# Patient Record
Sex: FEMALE | Race: WHITE | NOT HISPANIC OR LATINO | Employment: OTHER | URBAN - METROPOLITAN AREA
[De-identification: names, ages, dates, MRNs, and addresses within clinical notes are randomized per-mention and may not be internally consistent; named-entity substitution may affect disease eponyms.]

---

## 2017-08-14 NOTE — PROGRESS NOTES
SUBJECTIVE:   CC: Janet Barr is an 57 year old woman who presents for preventive health visit.     She has 2 issues today    1. Sore throat off and on for few months, no swallow issues, no f,c,s.    2. Heartburn off and on for 2 onths or more, usually food related, nothing otherwise makes it better or worse.  No f,c,s or weight loss, no abdomen pain or n/v, bm fine.  Coffee can affect it.  Not using prilosec reg.  No prior h/o this, just abdomen discomfort from time to time.  Not exertional.    No other c/o on review of systems, working out reg .Wants to get pap again, d/w patient not indicated but she prefers to have it yearly.  Up to date mammogram and colon.    Healthy Habits:    Do you get at least three servings of calcium containing foods daily (dairy, green leafy vegetables, etc.)? yes    Amount of exercise or daily activities, outside of work: 4 day(s) per week    Problems taking medications regularly No    Medication side effects: No    Have you had an eye exam in the past two years? yes    Do you see a dentist twice per year? yes    Do you have sleep apnea, excessive snoring or daytime drowsiness?no              Today's PHQ-2 Score: PHQ-2 ( 1999 Pf[employer]) 7/15/2016 6/29/2015   Q1: Little interest or pleasure in doing things 0 0   Q2: Feeling down, depressed or hopeless 0 0   PHQ-2 Score 0 0         Abuse: Current or Past(Physical, Sexual or Emotional)- No  Do you feel safe in your environment - Yes  Social History   Substance Use Topics     Smoking status: Former Smoker     Packs/day: 0.25     Years: 15.00     Types: Cigarettes     Quit date: 1/1/1999     Smokeless tobacco: Never Used     Alcohol use No     The patient does not drink >3 drinks per day nor >7 drinks per week.                    History of Present Illness:   This patient is a 57 year old female who presents for a complete physical examination.            Past Medical History:      Past Medical History:   Diagnosis Date     Allergic  rhinitis      Anxiety      ASCUS on Pap smear 4/28/11    HPV-neg     Aseptic necrosis (H)      Helicobacter pylori (H. pylori)      History of colonoscopy 2012    nl     Low back pain     lbp for 20 years, seen 2014 by Dr. Walters     Personal history of tobacco use, presenting hazards to health dced approx 1998     Primary localized osteoarthrosis, pelvic region and thigh      Skin cancer      Unspecified functional disorder of stomach     peptic ulcer             Past Surgical History:      Past Surgical History:   Procedure Laterality Date     C NONSPECIFIC PROCEDURE  x 2    Right Hip     C NONSPECIFIC PROCEDURE  1969    adenoid removed     C NONSPECIFIC PROCEDURE      hemorrhoid op     C NONSPECIFIC PROCEDURE  2007    endometrial bx     C NONSPECIFIC PROCEDURE  2009    d & c, fibroid resection, endometrial ablation     C NONSPECIFIC PROCEDURE  2009    Right total hip arthroplasty.     COLONOSCOPY  12/10/2012    Procedure: COLONOSCOPY;  colonoscopy;  Surgeon: Alonzo Milton MD;  Location:  GI             Social History:     Social History     Social History     Marital status:      Spouse name: N/A     Number of children: 2     Years of education: N/A     Occupational History     cleaning service Self     Social History Main Topics     Smoking status: Former Smoker     Packs/day: 0.25     Years: 15.00     Types: Cigarettes     Quit date: 1/1/1999     Smokeless tobacco: Never Used     Alcohol use No     Drug use: No     Sexual activity: Yes     Other Topics Concern     Not on file     Social History Narrative             Family History:   Reviewed          Immunizations:     Immunization History   Administered Date(s) Administered     TDAP Vaccine (Adacel) 05/21/2013            Allergies:     Allergies   Allergen Reactions     Celecoxib      gi     Codeine      headache              Medications:     Current Outpatient Prescriptions on File Prior to Visit:  omeprazole (PRILOSEC) 20 MG capsule TAKE  "30'-60' BEFORE 1ST MEAL DAILY IF NEEDED   cetirizine (ZYRTEC) 10 MG tablet Take 1 tablet by mouth daily as needed for allergies.   Multiple Vitamins-Minerals (MULTIVITAL PO) Take  by mouth.     No current facility-administered medications on file prior to visit.           Review of Systems:   The 10 point Review of Systems is negative other than noted in the HPI           Physical Exam:   Vitals were reviewed  Blood pressure (!) 89/60, pulse 54, temperature 98.1  F (36.7  C), temperature source Oral, height 5' 5\" (1.651 m), weight 147 lb (66.7 kg), SpO2 100 %, not currently breastfeeding.    Exam:  Constitutional: healthy appearing, alert and in no distress  Heent: Normocephalic. Head without obvious masses or lesions. PERRLDC, EOMI. Mouth exam within normal limits: tongue, mucous membranes, posterior pharynx all normal, no lesions or abnormalities seen.  Tm's and canals within normal limits bilaterally. Neck supple, no nuchal rigidity or masses. No supraclavicular, or cervical adenopathy. Thyroid symmetric, no masses.  Cardiovascular: Regular rate and rhythm, no murmer, rub or gallops.  JVP not elevated, no edema.  Carotids within normal limits bilaterally, no bruits.  Respiratory: Normal respiratory effort.  Lungs clear, normal flow, no wheezing or crackles.  Breasts: Normal bilaterally.  No masses or lesions.  Nipples within normal limites.  No axillary lesions or nodes.  Gastrointestinal: Normal active bowel sounds.   Soft, not tender, no masses, guarding or rebound.  No hepatosplenomegaly.   Pelvic: External genitalia within normal limits.  No masses or lesions.  Speculum exam unremarkable.  Cervix intact, no lesions. No significant discharge seen. Bimanual exam within normal limits.  No abnormal masses felt and no tenderness.  Recto-vaginal exam within normal limits.  Exam chaperoned by nurse.  Musculoskeletal: extremities normal, no gross deformities noted.  Skin: no suspicious lesions or rashes   Neurologic: " Mental status within normal limits.  Speech fluent.  No gross motor abnormalities and gait intact.  Psychiatric: mentation appears normal and affect normal.         Data:   Labs sent        Assessment:   1. Normal cpx  2. Gerd, doubt cv cause, malig cause  3. Throat discomfort, neg exam, suspect gerd  4. Abnormal pap, follow up have been normal  5. hcm         Plan:   Up to date immunizations, mammogram  Try daily ppi and if symptoms not gone in next 10 days call.  Use it for 6 weeks and if c/o return call and consider ent and egd  Exercise ,diet  Letter with labs      Alexy Rojo MD

## 2017-08-14 NOTE — PATIENT INSTRUCTIONS
Use the new heartburn medication called pantoprazole and if the heartburn and sore throat are not gone in the next 2 weeks let me know.  Take the medicine for 6 weeks and then stop it and if your symptoms return let me know.    Call if other issues    Alexy Rojo M.D.            Preventive Health Recommendations  Female Ages 50 - 64    Yearly exam: See your health care provider every year in order to  o Review health changes.   o Discuss preventive care.    o Review your medicines if your doctor has prescribed any.      Get a Pap test every three years (unless you have an abnormal result and your provider advises testing more often).    If you get Pap tests with HPV test, you only need to test every 5 years, unless you have an abnormal result.     You do not need a Pap test if your uterus was removed (hysterectomy) and you have not had cancer.    You should be tested each year for STDs (sexually transmitted diseases) if you're at risk.     Have a mammogram every 1 to 2 years.    Have a colonoscopy at age 50, or have a yearly FIT test (stool test). These exams screen for colon cancer.      Have a cholesterol test every 5 years, or more often if advised.    Have a diabetes test (fasting glucose) every three years. If you are at risk for diabetes, you should have this test more often.     If you are at risk for osteoporosis (brittle bone disease), think about having a bone density scan (DEXA).    Shots: Get a flu shot each year. Get a tetanus shot every 10 years.    Nutrition:     Eat at least 5 servings of fruits and vegetables each day.    Eat whole-grain bread, whole-wheat pasta and brown rice instead of white grains and rice.    Talk to your provider about Calcium and Vitamin D.     Lifestyle    Exercise at least 150 minutes a week (30 minutes a day, 5 days a week). This will help you control your weight and prevent disease.    Limit alcohol to one drink per day.    No smoking.     Wear sunscreen to prevent skin  cancer.     See your dentist every six months for an exam and cleaning.    See your eye doctor every 1 to 2 years.

## 2017-08-15 ENCOUNTER — OFFICE VISIT (OUTPATIENT)
Dept: FAMILY MEDICINE | Facility: CLINIC | Age: 57
End: 2017-08-15
Payer: COMMERCIAL

## 2017-08-15 VITALS
DIASTOLIC BLOOD PRESSURE: 60 MMHG | HEART RATE: 54 BPM | TEMPERATURE: 98.1 F | SYSTOLIC BLOOD PRESSURE: 89 MMHG | HEIGHT: 65 IN | WEIGHT: 147 LBS | BODY MASS INDEX: 24.49 KG/M2 | OXYGEN SATURATION: 100 %

## 2017-08-15 DIAGNOSIS — Z11.51 SCREENING FOR HUMAN PAPILLOMAVIRUS: ICD-10-CM

## 2017-08-15 DIAGNOSIS — R12 HEARTBURN: ICD-10-CM

## 2017-08-15 DIAGNOSIS — M54.5 LOW BACK PAIN, UNSPECIFIED BACK PAIN LATERALITY, UNSPECIFIED CHRONICITY, WITH SCIATICA PRESENCE UNSPECIFIED: ICD-10-CM

## 2017-08-15 DIAGNOSIS — Z00.00 ROUTINE GENERAL MEDICAL EXAMINATION AT A HEALTH CARE FACILITY: Primary | ICD-10-CM

## 2017-08-15 DIAGNOSIS — R07.0 THROAT PAIN: ICD-10-CM

## 2017-08-15 DIAGNOSIS — R87.610 PAPANICOLAOU SMEAR OF CERVIX WITH ATYPICAL SQUAMOUS CELLS OF UNDETERMINED SIGNIFICANCE (ASC-US): ICD-10-CM

## 2017-08-15 LAB
ALBUMIN SERPL-MCNC: 3.9 G/DL (ref 3.4–5)
ALP SERPL-CCNC: 74 U/L (ref 40–150)
ALT SERPL W P-5'-P-CCNC: 31 U/L (ref 0–50)
ANION GAP SERPL CALCULATED.3IONS-SCNC: 5 MMOL/L (ref 3–14)
AST SERPL W P-5'-P-CCNC: 23 U/L (ref 0–45)
BILIRUB SERPL-MCNC: 0.6 MG/DL (ref 0.2–1.3)
BUN SERPL-MCNC: 14 MG/DL (ref 7–30)
CALCIUM SERPL-MCNC: 9.4 MG/DL (ref 8.5–10.1)
CHLORIDE SERPL-SCNC: 109 MMOL/L (ref 94–109)
CHOLEST SERPL-MCNC: 223 MG/DL
CO2 SERPL-SCNC: 28 MMOL/L (ref 20–32)
CREAT SERPL-MCNC: 0.79 MG/DL (ref 0.52–1.04)
ERYTHROCYTE [DISTWIDTH] IN BLOOD BY AUTOMATED COUNT: 13.4 % (ref 10–15)
GFR SERPL CREATININE-BSD FRML MDRD: 75 ML/MIN/1.7M2
GLUCOSE SERPL-MCNC: 84 MG/DL (ref 70–99)
HCT VFR BLD AUTO: 43.3 % (ref 35–47)
HDLC SERPL-MCNC: 95 MG/DL
HGB BLD-MCNC: 14.1 G/DL (ref 11.7–15.7)
LDLC SERPL CALC-MCNC: 111 MG/DL
MCH RBC QN AUTO: 29.7 PG (ref 26.5–33)
MCHC RBC AUTO-ENTMCNC: 32.6 G/DL (ref 31.5–36.5)
MCV RBC AUTO: 91 FL (ref 78–100)
NONHDLC SERPL-MCNC: 128 MG/DL
PLATELET # BLD AUTO: 223 10E9/L (ref 150–450)
POTASSIUM SERPL-SCNC: 4.3 MMOL/L (ref 3.4–5.3)
PROT SERPL-MCNC: 7 G/DL (ref 6.8–8.8)
RBC # BLD AUTO: 4.75 10E12/L (ref 3.8–5.2)
SODIUM SERPL-SCNC: 142 MMOL/L (ref 133–144)
TRIGL SERPL-MCNC: 84 MG/DL
WBC # BLD AUTO: 4.8 10E9/L (ref 4–11)

## 2017-08-15 PROCEDURE — 85027 COMPLETE CBC AUTOMATED: CPT | Performed by: INTERNAL MEDICINE

## 2017-08-15 PROCEDURE — 80061 LIPID PANEL: CPT | Performed by: INTERNAL MEDICINE

## 2017-08-15 PROCEDURE — 87624 HPV HI-RISK TYP POOLED RSLT: CPT | Performed by: INTERNAL MEDICINE

## 2017-08-15 PROCEDURE — 80053 COMPREHEN METABOLIC PANEL: CPT | Performed by: INTERNAL MEDICINE

## 2017-08-15 PROCEDURE — G0145 SCR C/V CYTO,THINLAYER,RESCR: HCPCS | Performed by: INTERNAL MEDICINE

## 2017-08-15 PROCEDURE — 36415 COLL VENOUS BLD VENIPUNCTURE: CPT | Performed by: INTERNAL MEDICINE

## 2017-08-15 PROCEDURE — 99396 PREV VISIT EST AGE 40-64: CPT | Performed by: INTERNAL MEDICINE

## 2017-08-15 RX ORDER — PANTOPRAZOLE SODIUM 40 MG/1
40 TABLET, DELAYED RELEASE ORAL DAILY
Qty: 60 TABLET | Refills: 1 | Status: SHIPPED | OUTPATIENT
Start: 2017-08-15 | End: 2018-09-20

## 2017-08-15 NOTE — MR AVS SNAPSHOT
After Visit Summary   8/15/2017    Janet Barr    MRN: [id number]           Patient Information     Date Of Birth          1960        Visit Information        Provider Department      8/15/2017 9:30 AM Alexy Rojo MD [facility]        Today's Diagnoses     Routine general medical examination at a health care facility    -  1    Papanicolaou smear of cervix with atypical squamous cells of undetermined significance (ASC-US)        Low back pain, unspecified back pain laterality, unspecified chronicity, with sciatica presence unspecified        Heartburn        Throat pain          Care Instructions    Use the new heartburn medication called pantoprazole and if the heartburn and sore throat are not gone in the next 2 weeks let me know.  Take the medicine for 6 weeks and then stop it and if your symptoms return let me know.    Call if other issues    Alexy Rojo M.D.            Preventive Health Recommendations  Female Ages 50 - 64    Yearly exam: See your health care provider every year in order to  o Review health changes.   o Discuss preventive care.    o Review your medicines if your doctor has prescribed any.      Get a Pap test every three years (unless you have an abnormal result and your provider advises testing more often).    If you get Pap tests with HPV test, you only need to test every 5 years, unless you have an abnormal result.     You do not need a Pap test if your uterus was removed (hysterectomy) and you have not had cancer.    You should be tested each year for STDs (sexually transmitted diseases) if you're at risk.     Have a mammogram every 1 to 2 years.    Have a colonoscopy at age 50, or have a yearly FIT test (stool test). These exams screen for colon cancer.      Have a cholesterol test every 5 years, or more often if advised.    Have a diabetes test (fasting glucose) every three years. If you are at risk for diabetes, you should have this test  more often.     If you are at risk for osteoporosis (brittle bone disease), think about having a bone density scan (DEXA).    Shots: Get a flu shot each year. Get a tetanus shot every 10 years.    Nutrition:     Eat at least 5 servings of fruits and vegetables each day.    Eat whole-grain bread, whole-wheat pasta and brown rice instead of white grains and rice.    Talk to your provider about Calcium and Vitamin D.     Lifestyle    Exercise at least 150 minutes a week (30 minutes a day, 5 days a week). This will help you control your weight and prevent disease.    Limit alcohol to one drink per day.    No smoking.     Wear sunscreen to prevent skin cancer.     See your dentist every six months for an exam and cleaning.    See your eye doctor every 1 to 2 years.            Follow-ups after your visit        Who to contact     If you have questions or need follow up information about today's clinic visit or your schedule please contact [facility] directly at [phone].  Normal or non-critical lab and imaging results will be communicated to you by [employer], letter or phone within 4 business days after the clinic has received the results. If you do not hear from us within 7 days, please contact the clinic through [employer] or phone. If you have a critical or abnormal lab result, we will notify you by phone as soon as possible.  Submit refill requests through [employer] or call your pharmacy and they will forward the refill request to us. Please allow 3 business days for your refill to be completed.          Additional Information About Your Visit        [employer] Information     [employer] gives you secure access to your electronic health record. If you see a primary care provider, you can also send messages to your care team and make appointments. If you have questions, please call your primary care clinic.  If you do not have a primary care provider, please call [phone] and they will assist you.        Care  "EveryWhere ID     This is your Care EveryWhere ID. This could be used by other organizations to access your [place] medical records  [id number]        Your Vitals Were     Pulse Temperature Height Pulse Oximetry Breastfeeding? BMI (Body Mass Index)    54 98.1  F (36.7  C) (Oral) 5' 5\" (1.651 m) 100% No 24.46 kg/m2       Blood Pressure from Last 3 Encounters:   08/15/17 (!) 89/60   07/15/16 (!) 85/63   06/29/15 92/59    Weight from Last 3 Encounters:   08/15/17 147 lb (66.7 kg)   07/15/16 142 lb (64.4 kg)   06/29/15 145 lb 9.6 oz (66 kg)              We Performed the Following     CBC with platelets     Comprehensive metabolic panel     Lipid panel reflex to direct LDL          Today's Medication Changes          These changes are accurate as of: 8/15/17  9:54 AM.  If you have any questions, ask your nurse or doctor.               Start taking these medicines.        Dose/Directions    pantoprazole 40 MG EC tablet   Commonly known as:  PROTONIX   Used for:  Heartburn, Throat pain   Started by:  Alexy Rojo MD        Dose:  40 mg   Take 1 tablet (40 mg) by mouth daily Take 30-60 minutes before a meal.   Quantity:  60 tablet   Refills:  1            Where to get your medicines      These medications were sent to [id number] [place] [address]  [address] [place] [address]     Phone:  [phone]     pantoprazole 40 MG EC tablet                Primary Care Provider Office Phone # Fax #    Alexy Rojo [phone] [address] [facility] [address]  [facility] [address]        Equal Access to Services     GAYLE GOODSON AH: Hadzhen Zhang, wakajalda luqadaha, qaybta kaalmada ademary, abel jimenez. So [facility] [phone].    ATENCIÓN: Si habla español, tiene a lemos disposición servicios gratuitos de asistencia lingüística. Llame al [phone].    We comply with applicable federal civil rights " laws and [place] laws. We do not discriminate on the basis of race, color, national origin, age, disability sex, sexual orientation or gender identity.            Thank you!     Thank you for choosing [facility]  for your care. Our goal is always to provide you with excellent care. Hearing back from our patients is one way we can continue to improve our services. Please take a few minutes to complete the written survey that you may receive in the mail after your visit with us. Thank you!             Your Updated Medication List - Protect others around you: Learn how to safely use, store and throw away your medicines at www.disposemymeds.org.          This list is accurate as of: 8/15/17  9:54 AM.  Always use your most recent med list.                   Brand Name Dispense Instructions for use Diagnosis    cetirizine 10 MG tablet    zyrTEC     Take 1 tablet by mouth daily as needed for allergies.    Chronic rhinitis       MULTIVITAL PO      Take  by mouth.        omeprazole 20 MG CR capsule    priLOSEC    90 capsule    TAKE 30'-60' BEFORE 1ST MEAL DAILY IF NEEDED    Dyspepsia       pantoprazole 40 MG EC tablet    PROTONIX    60 tablet    Take 1 tablet (40 mg) by mouth daily Take 30-60 minutes before a meal.    Heartburn, Throat pain

## 2017-08-15 NOTE — NURSING NOTE
"Chief Complaint   Patient presents with     Physical       Initial BP (!) 89/60  Pulse 54  Temp 98.1  F (36.7  C) (Oral)  Ht 5' 5\" (1.651 m)  Wt 147 lb (66.7 kg)  SpO2 100%  Breastfeeding? No  BMI 24.46 kg/m2 Estimated body mass index is 24.46 kg/(m^2) as calculated from the following:    Height as of this encounter: 5' 5\" (1.651 m).    Weight as of this encounter: 147 lb (66.7 kg).  Medication Reconciliation: complete   Jessica MAYERS CMA      "

## 2017-08-16 NOTE — PROGRESS NOTES
It was a pleasure seeing you for your physical examination.  I wanted to get back to you with your test results.  I have enclosed a copy for your review.      I am happy to report that your cbc or complete blood count is normal with no signs of anemia, leukemia or platelet abnormalities. Your chemistry panel shows no signs of diabetes.  Your blood salts, kidney tests, liver tests, and proteins are all fine.    Your total cholesterol is 223 with the normal range being below 200.  Your HDL or good cholesterol is 95 with the normal range being above 50.  Your LDL or bad cholesterol is 111 with the normal range being below 130.  These numbers are very similar to last year and, as you know, given the large amount of good cholesterol overall they are very good.    I am happy to bring you this overall excellent report.  I believe your health is excellent.  If you have any questions please call me.    Alexy Rojo M.D.

## 2017-08-17 LAB
COPATH REPORT: NORMAL
PAP: NORMAL

## 2017-08-30 LAB
FINAL DIAGNOSIS: NORMAL
HPV HR 12 DNA CVX QL NAA+PROBE: NEGATIVE
HPV16 DNA SPEC QL NAA+PROBE: NEGATIVE
HPV18 DNA SPEC QL NAA+PROBE: NEGATIVE
SPECIMEN DESCRIPTION: NORMAL

## 2017-09-10 DIAGNOSIS — R10.13 DYSPEPSIA: ICD-10-CM

## 2017-09-11 NOTE — TELEPHONE ENCOUNTER
Pending Prescriptions:                       Disp   Refills    omeprazole (PRILOSEC) 20 MG CR capsule [P*90 cap*3            Sig: TAKE 1 CAPSULE BY MOUTH 30-60 MINUTES BEFORE 1ST           MEAL DAILY IF NEEDED          Last Written Prescription Date: 7/26/16  Last Fill Quantity: 90,  # refills: 3   Last Office Visit with FMKENNEDI, P or [facility] prescribing provider: 8/15/17

## 2017-09-11 NOTE — TELEPHONE ENCOUNTER
Prescription approved per [facility] Refill Protocol.  Rosalia Mulligan RN

## 2017-12-20 ENCOUNTER — HOSPITAL ENCOUNTER (OUTPATIENT)
Dept: MAMMOGRAPHY | Facility: CLINIC | Age: 57
Discharge: HOME | End: 2017-12-20
Attending: INTERNAL MEDICINE | Admitting: INTERNAL MEDICINE
Payer: COMMERCIAL

## 2017-12-20 DIAGNOSIS — Z12.31 ENCOUNTER FOR SCREENING MAMMOGRAM FOR HIGH-RISK PATIENT: ICD-10-CM

## 2017-12-20 PROCEDURE — G0202 SCR MAMMO BI INCL CAD: HCPCS

## 2018-05-02 DIAGNOSIS — F41.9 ANXIETY: ICD-10-CM

## 2018-05-02 RX ORDER — LORAZEPAM 0.5 MG/1
0.25-0.5 TABLET ORAL EVERY 6 HOURS PRN
Qty: 15 TABLET | Refills: 0 | Status: SHIPPED | OUTPATIENT
Start: 2018-05-02 | End: 2020-10-27

## 2018-05-02 NOTE — TELEPHONE ENCOUNTER
Lorazepam   Last Written Prescription Date:  6/23/14  Last Fill Quantity: 15,   # refills: 0  Last Office Visit: 8/15/17- physical exam   Future Office visit: none    Drug not active on patient's medication list  Med is controlled substance     Lorazepam was discontinued 7/15/16    Left a message asking patient to call back   Need to verify reason for request - is anxiety increased? Would advise OV if having increased anxiety     Tiffany MAYERS RN

## 2018-05-02 NOTE — TELEPHONE ENCOUNTER
Fax from [facility] #[phone]    Patient is requesting a Rx for Lorazepam 0.5mg    LOV 8-15-17 [facility] triage    Nicole Hidalgo, RT (R)

## 2018-05-02 NOTE — TELEPHONE ENCOUNTER
Patient called back   Only uses Lorazepam for anxiety related to flying   Very rarely uses this but prescription is  now   [place] and is worried about the long flight   Leaving 18     Rx is pended. Plans to schedule her annual physical for 2018    Please advise     Tiffany MAYERS RN

## 2018-08-29 DIAGNOSIS — R10.13 DYSPEPSIA: ICD-10-CM

## 2018-08-29 NOTE — TELEPHONE ENCOUNTER
"omeprazole (PRILOSEC) 20 MG CR capsule 90 capsule 3 9/11/2017     Last Written Prescription Date:  9/11/17  Last Fill Quantity: 90,  # refills: 3   Last office visit: 8/15/2017 with prescribing provider:  Alia   Future Office Visit:   Next 5 appointments (look out 90 days)     Sep 20, 2018 10:00 AM CDT   PHYSICAL with Alexy Rojo MD   [facility]    [address] [address] [phone]                 Requested Prescriptions   Pending Prescriptions Disp Refills     omeprazole (PRILOSEC) 20 MG CR capsule [Pharmacy Med Name: OMEPRAZOLE DR 20 MG CAPSULE] 90 capsule 3     Sig: TAKE 1 CAPSULE BY MOUTH 30-60 MINUTES BEFORE 1ST MEAL DAILY IF NEEDED    PPI Protocol Passed    8/29/2018  1:37 AM       Passed - Not on Clopidogrel (unless Pantoprazole ordered)       Passed - No diagnosis of osteoporosis on record       Passed - Recent (12 mo) or future (30 days) visit within the authorizing provider's specialty    Patient had office visit in the last 12 months or has a visit in the next 30 days with authorizing provider or within the authorizing provider's specialty.  See \"Patient Info\" tab in inbasket, or \"Choose Columns\" in Meds & Orders section of the refill encounter.           Passed - Patient is age 18 or older       Passed - No active pregnacy on record       Passed - No positive pregnancy test in past 12 months        No flowsheet data found.  "

## 2018-09-19 NOTE — PROGRESS NOTES
SUBJECTIVE:   CC: Janet Barr is an 58 year old woman who presents for preventive health visit.     Overall she is doing well and works out reg.  Wants to get yearly pap due to prior abnormal.    She has had cold for 2 weeks, had s.t and hoarse but that is improved, now has cough, occasionally prod, no fevers, chills or night sweats, no chest pain or shortness of breath, worried as sister has lung ca she thinks.  No other c/o.    Healthy Habits:    Do you get at least three servings of calcium containing foods daily (dairy, green leafy vegetables, etc.)? yes    Amount of exercise or daily activities, outside of work: 3 day(s) per week    Problems taking medications regularly No    Medication side effects: No    Have you had an eye exam in the past two years? yes    Do you see a dentist twice per year? yes    Do you have sleep apnea, excessive snoring or daytime drowsiness?no          Today's PHQ-2 Score:   PHQ-2 ( 1999 Pfizer) 7/15/2016 6/29/2015   Q1: Little interest or pleasure in doing things 0 0   Q2: Feeling down, depressed or hopeless 0 0   PHQ-2 Score 0 0       Abuse: Current or Past(Physical, Sexual or Emotional)- No  Do you feel safe in your environment - Yes    Social History   Substance Use Topics     Smoking status: Former Smoker     Packs/day: 0.25     Years: 15.00     Types: Cigarettes     Quit date: 1/1/1999     Smokeless tobacco: Never Used     Alcohol use No     If you drink alcohol do you typically have >3 drinks per day or >7 drinks per week? No                    History of Present Illness:   This patient is a 58 year old female who presents for a complete physical examination.            Past Medical History:      Past Medical History:   Diagnosis Date     Allergic rhinitis      Anxiety      ASCUS on Pap smear 4/28/11    HPV-neg     Aseptic necrosis (H)      Helicobacter pylori (H. pylori)      History of colonoscopy 2012    nl     Low back pain     lbp for 20 years, seen 2014 by   Romeo     Personal history of tobacco use, presenting hazards to health dced approx 1998     Primary localized osteoarthrosis, pelvic region and thigh      Skin cancer      Unspecified functional disorder of stomach     peptic ulcer             Past Surgical History:      Past Surgical History:   Procedure Laterality Date     C NONSPECIFIC PROCEDURE  x 2    Right Hip     C NONSPECIFIC PROCEDURE  1969    adenoid removed     C NONSPECIFIC PROCEDURE      hemorrhoid op     C NONSPECIFIC PROCEDURE  2007    endometrial bx     C NONSPECIFIC PROCEDURE  2009    d & c, fibroid resection, endometrial ablation     C NONSPECIFIC PROCEDURE  2009    Right total hip arthroplasty.     COLONOSCOPY  12/10/2012    Procedure: COLONOSCOPY;  colonoscopy;  Surgeon: Alonzo Milton MD;  Location:  GI             Social History:     Social History     Social History     Marital status:      Spouse name: N/A     Number of children: 2     Years of education: N/A     Occupational History     cleaning service Self     Social History Main Topics     Smoking status: Former Smoker     Packs/day: 0.25     Years: 15.00     Types: Cigarettes     Quit date: 1/1/1999     Smokeless tobacco: Never Used     Alcohol use No     Drug use: No     Sexual activity: Yes     Other Topics Concern     Not on file     Social History Narrative             Family History:   Reviewed          Immunizations:     Immunization History   Administered Date(s) Administered     TDAP Vaccine (Adacel) 05/21/2013            Allergies:     Allergies   Allergen Reactions     Celecoxib      gi     Codeine      headache              Medications:     Current Outpatient Prescriptions on File Prior to Visit:  cetirizine (ZYRTEC) 10 MG tablet Take 1 tablet by mouth daily as needed for allergies.   LORazepam (ATIVAN) 0.5 MG tablet Take 0.5-1 tablets (0.25-0.5 mg) by mouth every 6 hours as needed for anxiety Take 30 minutes prior to departure.  Do not operate a vehicle after  "taking this medication   Multiple Vitamins-Minerals (MULTIVITAL PO) Take  by mouth.   omeprazole (PRILOSEC) 20 MG CR capsule TAKE 1 CAPSULE BY MOUTH 30-60 MINUTES BEFORE 1ST MEAL DAILY IF NEEDED     No current facility-administered medications on file prior to visit.           Review of Systems:   The 10 point Review of Systems is negative other than noted in the HPI           Physical Exam:   Vitals were reviewed  Blood pressure 104/68, pulse 59, temperature 98.2  F (36.8  C), temperature source Oral, height 5' 5\" (1.651 m), weight 145 lb (65.8 kg), SpO2 99 %, not currently breastfeeding.    Exam:  Constitutional: healthy appearing, alert and in no distress  Heent: Normocephalic. Head without obvious masses or lesions. PERRLDC, EOMI. Mouth exam within normal limits: tongue, mucous membranes, posterior pharynx all normal, no lesions or abnormalities seen.  Tm's and canals within normal limits bilaterally. Neck supple, no nuchal rigidity or masses. No supraclavicular, or cervical adenopathy. Thyroid symmetric, no masses.  Cardiovascular: Regular rate and rhythm, no murmer, rub or gallops.  JVP not elevated, no edema.  Carotids within normal limits bilaterally, no bruits.  Respiratory: Normal respiratory effort.  Lungs clear, normal flow, no wheezing or crackles.  Breasts: Normal bilaterally.  No masses or lesions.  Nipples within normal limites.  No axillary lesions or nodes.  My M.A. Was present during this part of the examination.  Gastrointestinal: Normal active bowel sounds.   Soft, not tender, no masses, guarding or rebound.  No hepatosplenomegaly.   Pelvic: External genitalia within normal limits.  No masses or lesions.  Speculum exam unremarkable.  Cervix intact, no lesions. No significant discharge seen. Bimanual exam within normal limits.  No abnormal masses felt and no tenderness.  Recto-vaginal exam within normal limits.  Exam chaperoned by nurse.  Musculoskeletal: extremities normal, no gross deformities " noted.  Skin: no suspicious lesions or rashes   Neurologic: Mental status within normal limits.  Speech fluent.  No gross motor abnormalities and gait intact.  Psychiatric: mentation appears normal and affect normal.         Data:   Labs sent; cxr to be done        Assessment:   1. Normal complete physical exam  2. Cough, cold, most c/w uri, doubt other cause, doubt asthma, cancer, etc, doubt sinusitis  3. hcm         Plan:   Flu shot  Up to date tdap, mammogram, colon  Exercise, diet  Letter with labs  Call if uri not gone over next 2 weeks or worsens      Alexy Rojo MD

## 2018-09-20 ENCOUNTER — RADIANT APPOINTMENT (OUTPATIENT)
Dept: GENERAL RADIOLOGY | Facility: CLINIC | Age: 58
End: 2018-09-20
Attending: INTERNAL MEDICINE
Payer: COMMERCIAL

## 2018-09-20 ENCOUNTER — OFFICE VISIT (OUTPATIENT)
Dept: FAMILY MEDICINE | Facility: CLINIC | Age: 58
End: 2018-09-20
Payer: COMMERCIAL

## 2018-09-20 VITALS
SYSTOLIC BLOOD PRESSURE: 104 MMHG | TEMPERATURE: 98.2 F | HEIGHT: 65 IN | DIASTOLIC BLOOD PRESSURE: 68 MMHG | OXYGEN SATURATION: 99 % | WEIGHT: 145 LBS | BODY MASS INDEX: 24.16 KG/M2 | HEART RATE: 59 BPM

## 2018-09-20 DIAGNOSIS — J06.9 VIRAL URI: ICD-10-CM

## 2018-09-20 DIAGNOSIS — R05.9 COUGH: ICD-10-CM

## 2018-09-20 DIAGNOSIS — Z23 NEED FOR PROPHYLACTIC VACCINATION AND INOCULATION AGAINST INFLUENZA: ICD-10-CM

## 2018-09-20 DIAGNOSIS — Z23 NEED FOR VACCINATION: ICD-10-CM

## 2018-09-20 DIAGNOSIS — Z00.00 ROUTINE GENERAL MEDICAL EXAMINATION AT A HEALTH CARE FACILITY: Primary | ICD-10-CM

## 2018-09-20 DIAGNOSIS — R87.610 ATYPICAL SQUAMOUS CELLS OF UNDETERMINED SIGNIFICANCE ON CYTOLOGIC SMEAR OF CERVIX (ASC-US): ICD-10-CM

## 2018-09-20 LAB
ERYTHROCYTE [DISTWIDTH] IN BLOOD BY AUTOMATED COUNT: 13.5 % (ref 10–15)
HCT VFR BLD AUTO: 43.6 % (ref 35–47)
HGB BLD-MCNC: 14.1 G/DL (ref 11.7–15.7)
MCH RBC QN AUTO: 29.7 PG (ref 26.5–33)
MCHC RBC AUTO-ENTMCNC: 32.3 G/DL (ref 31.5–36.5)
MCV RBC AUTO: 92 FL (ref 78–100)
PLATELET # BLD AUTO: 245 10E9/L (ref 150–450)
RBC # BLD AUTO: 4.75 10E12/L (ref 3.8–5.2)
WBC # BLD AUTO: 7.4 10E9/L (ref 4–11)

## 2018-09-20 PROCEDURE — 80053 COMPREHEN METABOLIC PANEL: CPT | Performed by: INTERNAL MEDICINE

## 2018-09-20 PROCEDURE — 90471 IMMUNIZATION ADMIN: CPT | Performed by: INTERNAL MEDICINE

## 2018-09-20 PROCEDURE — 80061 LIPID PANEL: CPT | Performed by: INTERNAL MEDICINE

## 2018-09-20 PROCEDURE — G0145 SCR C/V CYTO,THINLAYER,RESCR: HCPCS | Performed by: INTERNAL MEDICINE

## 2018-09-20 PROCEDURE — 87389 HIV-1 AG W/HIV-1&-2 AB AG IA: CPT | Performed by: INTERNAL MEDICINE

## 2018-09-20 PROCEDURE — 85027 COMPLETE CBC AUTOMATED: CPT | Performed by: INTERNAL MEDICINE

## 2018-09-20 PROCEDURE — 71046 X-RAY EXAM CHEST 2 VIEWS: CPT

## 2018-09-20 PROCEDURE — 99396 PREV VISIT EST AGE 40-64: CPT | Mod: 25 | Performed by: INTERNAL MEDICINE

## 2018-09-20 PROCEDURE — 36415 COLL VENOUS BLD VENIPUNCTURE: CPT | Performed by: INTERNAL MEDICINE

## 2018-09-20 PROCEDURE — 90686 IIV4 VACC NO PRSV 0.5 ML IM: CPT | Performed by: INTERNAL MEDICINE

## 2018-09-20 PROCEDURE — 87624 HPV HI-RISK TYP POOLED RSLT: CPT | Performed by: INTERNAL MEDICINE

## 2018-09-20 PROCEDURE — 99213 OFFICE O/P EST LOW 20 MIN: CPT | Mod: 25 | Performed by: INTERNAL MEDICINE

## 2018-09-20 NOTE — LETTER
[place] [address] [facility]  [address]  [place]  [address]  Tel: [phone]    September 21, 2018    Janet Barr  [address]  [facility] [address]        Dear Ms. Barr,    I am happy to report that your cbc or complete blood count is normal with no signs of anemia, leukemia or platelet abnormalities. Your chemistry panel shows no diabetes.  Your blood sugar is barely elevated so be sure to exercise and eat a healthy diet for this.  Your blood salts, kidney tests, liver tests, hiv test, and proteins are all fine.    Your total cholesterol is 229 with the normal range being below 200.  Your HDL or good cholesterol is 74 with the normal range being above 50.  Your LDL or bad cholesterol is 133 with the normal range being below 130.  Given the good ratio I would not add medication but please exercise as noted above.    I am happy to bring you this overall excellent report.    If you have any further questions or problems, please contact our office.      Sincerely,    Alexy Rojo MD/ Shelly Wagoner CMA  Results for orders placed or performed in visit on 09/20/18   CBC with platelets   Result Value Ref Range    WBC 7.4 4.0 - 11.0 10e9/L    RBC Count 4.75 3.8 - 5.2 10e12/L    Hemoglobin 14.1 11.7 - 15.7 g/dL    Hematocrit 43.6 35.0 - 47.0 %    MCV 92 78 - 100 fl    MCH 29.7 26.5 - 33.0 pg    MCHC 32.3 31.5 - 36.5 g/dL    RDW 13.5 10.0 - 15.0 %    Platelet Count 245 150 - 450 10e9/L   Comprehensive metabolic panel   Result Value Ref Range    Sodium 141 133 - 144 mmol/L    Potassium 4.1 3.4 - 5.3 mmol/L    Chloride 106 94 - 109 mmol/L    Carbon Dioxide 29 20 - 32 mmol/L    Anion Gap 6 3 - 14 mmol/L    Glucose 104 (H) 70 - 99 mg/dL    Urea Nitrogen 16 7 - 30 mg/dL    Creatinine 0.77 0.52 - 1.04 mg/dL    GFR Estimate 77 >60 mL/min/1.7m2    GFR Estimate If Black >90 >60 mL/min/1.7m2    Calcium 9.3 8.5 - 10.1 mg/dL    Bilirubin Total 0.5 0.2 - 1.3 mg/dL    Albumin 3.9 3.4 - 5.0 g/dL     Protein Total 7.3 6.8 - 8.8 g/dL    Alkaline Phosphatase 80 40 - 150 U/L    ALT 26 0 - 50 U/L    AST 21 0 - 45 U/L   HIV Antigen Antibody Combo   Result Value Ref Range    HIV Antigen Antibody Combo Nonreactive NR^Nonreactive       Lipid panel reflex to direct LDL Non-fasting   Result Value Ref Range    Cholesterol 229 (H) <200 mg/dL    Triglycerides 112 <150 mg/dL    HDL Cholesterol 74 >49 mg/dL    LDL Cholesterol Calculated 133 (H) <100 mg/dL    Non HDL Cholesterol 155 (H) <130 mg/dL               Enclosure: Lab Results

## 2018-09-20 NOTE — MR AVS SNAPSHOT
After Visit Summary   9/20/2018    Janet Barr    MRN: [id number]           Patient Information     Date Of Birth          1960        Visit Information        Provider Department      9/20/2018 10:00 AM Alexy Rojo MD [facility]        Today's Diagnoses     Routine general medical examination at a health care facility    -  1    Cough        Viral URI          Care Instructions      Preventive Health Recommendations  Female Ages 50 - 64    Yearly exam: See your health care provider every year in order to  o Review health changes.   o Discuss preventive care.    o Review your medicines if your doctor has prescribed any.      Get a Pap test every three years (unless you have an abnormal result and your provider advises testing more often).    If you get Pap tests with HPV test, you only need to test every 5 years, unless you have an abnormal result.     You do not need a Pap test if your uterus was removed (hysterectomy) and you have not had cancer.    You should be tested each year for STDs (sexually transmitted diseases) if you're at risk.     Have a mammogram every 1 to 2 years.    Have a colonoscopy at age 50, or have a yearly FIT test (stool test). These exams screen for colon cancer.      Have a cholesterol test every 5 years, or more often if advised.    Have a diabetes test (fasting glucose) every three years. If you are at risk for diabetes, you should have this test more often.     If you are at risk for osteoporosis (brittle bone disease), think about having a bone density scan (DEXA).    Shots: Get a flu shot each year. Get a tetanus shot every 10 years.    Nutrition:     Eat at least 5 servings of fruits and vegetables each day.    Eat whole-grain bread, whole-wheat pasta and brown rice instead of white grains and rice.    Get adequate Calcium and Vitamin D.     Lifestyle    Exercise at least 150 minutes a week (30 minutes a day, 5 days a week). This will  "help you control your weight and prevent disease.    Limit alcohol to one drink per day.    No smoking.     Wear sunscreen to prevent skin cancer.     See your dentist every six months for an exam and cleaning.    See your eye doctor every 1 to 2 years.            Follow-ups after your visit        Who to contact     If you have questions or need follow up information about today's clinic visit or your schedule please contact [facility] directly at [phone].  Normal or non-critical lab and imaging results will be communicated to you by fluid Operationshart, letter or phone within 4 business days after the clinic has received the results. If you do not hear from us within 7 days, please contact the clinic through Volt[employer] or phone. If you have a critical or abnormal lab result, we will notify you by phone as soon as possible.  Submit refill requests through [employer] or call your pharmacy and they will forward the refill request to us. Please allow 3 business days for your refill to be completed.          Additional Information About Your Visit        VoltDB Information     [employer] gives you secure access to your electronic health record. If you see a primary care provider, you can also send messages to your care team and make appointments. If you have questions, please call your primary care clinic.  If you do not have a primary care provider, please call [phone] and they will assist you.        Care EveryWhere ID     This is your Care EveryWhere ID. This could be used by other organizations to access your [facility] medical records  [id number]        Your Vitals Were     Pulse Temperature Height Pulse Oximetry Breastfeeding? BMI (Body Mass Index)    59 98.2  F (36.8  C) (Oral) 5' 5\" (1.651 m) 99% No 24.13 kg/m2       Blood Pressure from Last 3 Encounters:   09/20/18 104/68   08/15/17 (!) 89/60   07/15/16 (!) 85/63    Weight from Last 3 Encounters:   09/20/18 145 lb (65.8 kg)   08/15/17 147 lb (66.7 kg) "   07/15/16 142 lb (64.4 kg)              We Performed the Following     CBC with platelets     Comprehensive metabolic panel     HIV Antigen Antibody Combo     Lipid panel reflex to direct LDL Non-fasting          Today's Medication Changes          These changes are accurate as of 9/20/18 10:21 AM.  If you have any questions, ask your nurse or doctor.               Stop taking these medicines if you haven't already. Please contact your care team if you have questions.     pantoprazole 40 MG EC tablet   Commonly known as:  PROTONIX   Stopped by:  Alexy Rojo MD                    Primary Care Provider Office Phone # Fax #    Alexy Rojo [phone] [address]  [facility] [address]        Equal Access to Services     [facility]: Hadii yarelis mcclure hadbraedeno Sopradeep, waaxda luqadaha, qaybta kaalmada adeceeyada, abel villatoro . [facility] [phone].    ATENCIÓN: Si habla español, tiene a lemos disposición servicios gratuitos de asistencia lingüística. [facility] [phone].    We comply with applicable federal civil rights laws and [place] laws. We do not discriminate on the basis of race, color, national origin, age, disability, sex, sexual orientation, or gender identity.            Thank you!     Thank you for choosing [facility]  for your care. Our goal is always to provide you with excellent care. Hearing back from our patients is one way we can continue to improve our services. Please take a few minutes to complete the written survey that you may receive in the mail after your visit with us. Thank you!             Your Updated Medication List - Protect others around you: Learn how to safely use, store and throw away your medicines at www.disposemymeds.org.          This list is accurate as of 9/20/18 10:21 AM.  Always use your most recent med list.                   Brand Name Dispense Instructions for use Diagnosis    cetirizine  10 MG tablet    zyrTEC     Take 1 tablet by mouth daily as needed for allergies.    Chronic rhinitis       LORazepam 0.5 MG tablet    ATIVAN    15 tablet    Take 0.5-1 tablets (0.25-0.5 mg) by mouth every 6 hours as needed for anxiety Take 30 minutes prior to departure.  Do not operate a vehicle after taking this medication    Anxiety       MULTIVITAL PO      Take  by mouth.        omeprazole 20 MG CR capsule    priLOSEC    90 capsule    TAKE 1 CAPSULE BY MOUTH 30-60 MINUTES BEFORE 1ST MEAL DAILY IF NEEDED    Dyspepsia

## 2018-09-20 NOTE — PROGRESS NOTES
It was nice to see you.  Your chest xray is clear.    If you have any questions please call me.    Alexy Rojo M.D.

## 2018-09-20 NOTE — PROGRESS NOTES
Injectable Influenza Immunization Documentation    1.  Is the person to be vaccinated sick today?   No    2. Does the person to be vaccinated have an allergy to a component   of the vaccine?   No  Egg Allergy Algorithm Link    3. Has the person to be vaccinated ever had a serious reaction   to influenza vaccine in the past?   No    4. Has the person to be vaccinated ever had Guillain-Barré syndrome?   No    Form completed by Shelly Wagoner CMA  Prior to injection verified patient identity using patient's name and date of birth.  Due to injection administration, patient instructed to remain in clinic for 15 minutes  afterwards, and to report any adverse reaction to me immediately.

## 2018-09-21 LAB
ALBUMIN SERPL-MCNC: 3.9 G/DL (ref 3.4–5)
ALP SERPL-CCNC: 80 U/L (ref 40–150)
ALT SERPL W P-5'-P-CCNC: 26 U/L (ref 0–50)
ANION GAP SERPL CALCULATED.3IONS-SCNC: 6 MMOL/L (ref 3–14)
AST SERPL W P-5'-P-CCNC: 21 U/L (ref 0–45)
BILIRUB SERPL-MCNC: 0.5 MG/DL (ref 0.2–1.3)
BUN SERPL-MCNC: 16 MG/DL (ref 7–30)
CALCIUM SERPL-MCNC: 9.3 MG/DL (ref 8.5–10.1)
CHLORIDE SERPL-SCNC: 106 MMOL/L (ref 94–109)
CHOLEST SERPL-MCNC: 229 MG/DL
CO2 SERPL-SCNC: 29 MMOL/L (ref 20–32)
CREAT SERPL-MCNC: 0.77 MG/DL (ref 0.52–1.04)
GFR SERPL CREATININE-BSD FRML MDRD: 77 ML/MIN/1.7M2
GLUCOSE SERPL-MCNC: 104 MG/DL (ref 70–99)
HDLC SERPL-MCNC: 74 MG/DL
HIV 1+2 AB+HIV1 P24 AG SERPL QL IA: NONREACTIVE
LDLC SERPL CALC-MCNC: 133 MG/DL
NONHDLC SERPL-MCNC: 155 MG/DL
POTASSIUM SERPL-SCNC: 4.1 MMOL/L (ref 3.4–5.3)
PROT SERPL-MCNC: 7.3 G/DL (ref 6.8–8.8)
SODIUM SERPL-SCNC: 141 MMOL/L (ref 133–144)
TRIGL SERPL-MCNC: 112 MG/DL

## 2018-09-21 NOTE — PROGRESS NOTES
It was a pleasure seeing you for your physical examination.  I wanted to get back to you with your test results.  I have enclosed a copy for your review.      I am happy to report that your cbc or complete blood count is normal with no signs of anemia, leukemia or platelet abnormalities. Your chemistry panel shows no diabetes.  Your blood sugar is barely elevated so be sure to exercise and eat a healthy diet for this.  Your blood salts, kidney tests, liver tests, hiv test, and proteins are all fine.    Your total cholesterol is 229 with the normal range being below 200.  Your HDL or good cholesterol is 74 with the normal range being above 50.  Your LDL or bad cholesterol is 133 with the normal range being below 130.  Given the good ratio I would not add medication but please exercise as noted above.    I am happy to bring you this overall excellent report.  If you have any questions please call me.    Alexy Rojo M.D.

## 2018-09-24 LAB
COPATH REPORT: NORMAL
PAP: NORMAL

## 2018-09-26 LAB
FINAL DIAGNOSIS: NORMAL
HPV HR 12 DNA CVX QL NAA+PROBE: NEGATIVE
HPV16 DNA SPEC QL NAA+PROBE: NEGATIVE
HPV18 DNA SPEC QL NAA+PROBE: NEGATIVE
SPECIMEN DESCRIPTION: NORMAL
SPECIMEN SOURCE CVX/VAG CYTO: NORMAL

## 2018-12-28 ENCOUNTER — HOSPITAL ENCOUNTER (OUTPATIENT)
Dept: MAMMOGRAPHY | Facility: CLINIC | Age: 58
Discharge: HOME | End: 2018-12-28
Attending: INTERNAL MEDICINE | Admitting: INTERNAL MEDICINE
Payer: COMMERCIAL

## 2018-12-28 DIAGNOSIS — Z12.31 VISIT FOR SCREENING MAMMOGRAM: ICD-10-CM

## 2018-12-28 PROCEDURE — 77067 SCR MAMMO BI INCL CAD: CPT

## 2019-02-21 ENCOUNTER — OFFICE VISIT (OUTPATIENT)
Dept: FAMILY MEDICINE | Facility: CLINIC | Age: 59
End: 2019-02-21
Payer: COMMERCIAL

## 2019-02-21 ENCOUNTER — ANCILLARY PROCEDURE (OUTPATIENT)
Dept: GENERAL RADIOLOGY | Facility: CLINIC | Age: 59
End: 2019-02-21
Attending: INTERNAL MEDICINE
Payer: COMMERCIAL

## 2019-02-21 VITALS
BODY MASS INDEX: 24.16 KG/M2 | OXYGEN SATURATION: 97 % | SYSTOLIC BLOOD PRESSURE: 94 MMHG | TEMPERATURE: 97.2 F | WEIGHT: 145 LBS | DIASTOLIC BLOOD PRESSURE: 54 MMHG | HEIGHT: 65 IN | HEART RATE: 68 BPM

## 2019-02-21 DIAGNOSIS — M25.511 ACUTE PAIN OF RIGHT SHOULDER: Primary | ICD-10-CM

## 2019-02-21 DIAGNOSIS — M25.511 ACUTE PAIN OF RIGHT SHOULDER: ICD-10-CM

## 2019-02-21 PROCEDURE — 99213 OFFICE O/P EST LOW 20 MIN: CPT | Performed by: INTERNAL MEDICINE

## 2019-02-21 PROCEDURE — 73030 X-RAY EXAM OF SHOULDER: CPT | Mod: RT

## 2019-02-21 ASSESSMENT — MIFFLIN-ST. JEOR: SCORE: 1238.6

## 2019-02-21 NOTE — PROGRESS NOTES
The patient presents for atraumatic right shoulder pain.  She does not have rest pain but it hurts when she uses it especially abducting or reaching behind.  It has been 2 months and getting a bit worse.  The left side is mostly fine.  There was no injury.  No fevers and she is not ill.  No tingling numbness or weakness.  Occasionally can radiate down the right arm.    Past Medical History:   Diagnosis Date     Allergic rhinitis      Anxiety      ASCUS on Pap smear 4/28/11    HPV-neg     Aseptic necrosis (H)      Helicobacter pylori (H. pylori)      History of colonoscopy 2012    nl     Low back pain     lbp for 20 years, seen 2014 by Dr. Walters     Personal history of tobacco use, presenting hazards to health dced approx 1998     Primary localized osteoarthrosis, pelvic region and thigh      Skin cancer      Unspecified functional disorder of stomach     peptic ulcer     Past Surgical History:   Procedure Laterality Date     C NONSPECIFIC PROCEDURE  x 2    Right Hip     C NONSPECIFIC PROCEDURE  1969    adenoid removed     C NONSPECIFIC PROCEDURE      hemorrhoid op     C NONSPECIFIC PROCEDURE  2007    endometrial bx     C NONSPECIFIC PROCEDURE  2009    d & c, fibroid resection, endometrial ablation     C NONSPECIFIC PROCEDURE  2009    Right total hip arthroplasty.     COLONOSCOPY  12/10/2012    Procedure: COLONOSCOPY;  colonoscopy;  Surgeon: Alonzo Milton MD;  Location:  GI     Social History     Socioeconomic History     Marital status:      Spouse name: Not on file     Number of children: 2     Years of education: Not on file     Highest education level: Not on file   Occupational History     Occupation: cleaning service     Employer: SELF   Social Needs     Financial resource strain: Not on file     Food insecurity:     Worry: Not on file     Inability: Not on file     Transportation needs:     Medical: Not on file     Non-medical: Not on file   Tobacco Use     Smoking status: Former Smoker      "Packs/day: 0.25     Years: 15.00     Pack years: 3.75     Types: Cigarettes     Last attempt to quit: 1999     Years since quittin.1     Smokeless tobacco: Never Used   Substance and Sexual Activity     Alcohol use: No     Drug use: No     Sexual activity: Yes   Lifestyle     Physical activity:     Days per week: Not on file     Minutes per session: Not on file     Stress: Not on file   Relationships     Social connections:     Talks on phone: Not on file     Gets together: Not on file     Attends Zoroastrianism service: Not on file     Active member of club or organization: Not on file     Attends meetings of clubs or organizations: Not on file     Relationship status: Not on file     Intimate partner violence:     Fear of current or ex partner: Not on file     Emotionally abused: Not on file     Physically abused: Not on file     Forced sexual activity: Not on file   Other Topics Concern     Parent/sibling w/ CABG, MI or angioplasty before 65F 55M? Not Asked   Social History Narrative     Not on file     Current Outpatient Medications   Medication Sig Dispense Refill     cetirizine (ZYRTEC) 10 MG tablet Take 1 tablet by mouth daily as needed for allergies.       LORazepam (ATIVAN) 0.5 MG tablet Take 0.5-1 tablets (0.25-0.5 mg) by mouth every 6 hours as needed for anxiety Take 30 minutes prior to departure.  Do not operate a vehicle after taking this medication 15 tablet 0     Multiple Vitamins-Minerals (MULTIVITAL PO) Take  by mouth.       omeprazole (PRILOSEC) 20 MG CR capsule TAKE 1 CAPSULE BY MOUTH 30-60 MINUTES BEFORE 1ST MEAL DAILY IF NEEDED 90 capsule 3     Allergies   Allergen Reactions     Celecoxib      gi     Codeine      headache      FAMILY HISTORY NOTED AND REVIEWED    REVIEW OF SYSTEMS: above    PHYSICAL EXAM    BP 94/54 (BP Location: Left arm, Cuff Size: Adult Regular)   Pulse 68   Temp 97.2  F (36.2  C) (Oral)   Ht 1.651 m (5' 5\")   Wt 65.8 kg (145 lb)   SpO2 97%   BMI 24.13 kg/m  "     Patient appears non toxic  No upper extremity swelling, redness or tenderness.  She has fairly good range of motion with the right upper extremity but pain when she abducts or reaches behind.  CMS is intact in the right upper extremity.    Xray to be done    ASSESSMENT:  Shoulder pain, suspect rot cuff, doubt tear, bone, infection    PLAN:  Xray  MUSHTAQ  If not going away soon to sports med    Alexy Rojo M.D.

## 2019-02-21 NOTE — PATIENT INSTRUCTIONS
Try the physical therapy but if the pain is not going away over the next 2 weeks let me know.    Alexy Rojo M.D.

## 2019-02-22 DIAGNOSIS — Z91.89 AT RISK FOR DENTAL PROBLEMS: Primary | ICD-10-CM

## 2019-02-22 NOTE — RESULT ENCOUNTER NOTE
Good morning,    Your xray is normal which is good.  Please let me know if the physical therapy is not relieving the pain    Alexy Rojo M.D.

## 2019-02-22 NOTE — TELEPHONE ENCOUNTER
Pending Prescriptions:                       Disp   Refills    cephALEXin (KEFLEX) 250 MG capsule                            Sig: Take 1 capsule (250 mg) by mouth 4 times daily    Signed Prescriptions:                        Disp   Refills    cephALEXin (KEFLEX) 250 MG capsule                         Sig: Take 250 mg by mouth 4 times daily  Authorizing Provider: PATIENT REPORTED  Ordering User: LAUREN PA    Pending Prescriptions:                       Disp   Refills    cephALEXin (KEFLEX) 250 MG capsule                            Sig: Take 1 capsule (250 mg) by mouth 4 times daily    Signed Prescriptions:                        Disp   Refills    cephALEXin (KEFLEX) 250 MG capsule                         Sig: Take 250 mg by mouth 4 times daily  Authorizing Provider: PATIENT REPORTED  Ordering User: LAUREN PA       Last Office Visit: 2/21/19  Future Office visit:       Routing refill request to provider for review/approval because:  Drug not active on patient's medication list

## 2019-02-25 NOTE — TELEPHONE ENCOUNTER
Left pt message to call back to triage, as unsure why this is being requested.   No history of prescribing this is chart.   Pt was in 2/21/19, but no notes in OV mentioning a need for Keflex.    Emily Bishop RN

## 2019-02-26 RX ORDER — AMOXICILLIN 500 MG/1
CAPSULE ORAL
Qty: 4 CAPSULE | Refills: 3 | Status: SHIPPED | OUTPATIENT
Start: 2019-02-26 | End: 2022-03-11

## 2019-02-26 NOTE — TELEPHONE ENCOUNTER
Spoke with pt: Pt reports that she is scheduled for a dental cleaning on March 12th, 2019. Pt has a hx of a right hip replacement about 10 years ago. Pt requesting a prophylactic antibiotic before the procedure. Pt states that she does not remember which antibiotic she has used in the past but she had the bottle which she dropped off at the pharmacy. Keflex is what the pharmacy([facility] [address]) is requesting.    Please review

## 2019-02-27 ENCOUNTER — THERAPY VISIT (OUTPATIENT)
Dept: PHYSICAL THERAPY | Facility: CLINIC | Age: 59
End: 2019-02-27
Payer: COMMERCIAL

## 2019-02-27 DIAGNOSIS — M25.512 ACUTE PAIN OF BOTH SHOULDERS: Primary | ICD-10-CM

## 2019-02-27 DIAGNOSIS — M25.511 ACUTE PAIN OF BOTH SHOULDERS: Primary | ICD-10-CM

## 2019-02-27 DIAGNOSIS — M25.511 ACUTE PAIN OF RIGHT SHOULDER: ICD-10-CM

## 2019-02-27 PROCEDURE — 97140 MANUAL THERAPY 1/> REGIONS: CPT | Mod: GP | Performed by: PHYSICAL THERAPIST

## 2019-02-27 PROCEDURE — 97035 APP MDLTY 1+ULTRASOUND EA 15: CPT | Mod: GP | Performed by: PHYSICAL THERAPIST

## 2019-02-27 PROCEDURE — 97110 THERAPEUTIC EXERCISES: CPT | Mod: GP | Performed by: PHYSICAL THERAPIST

## 2019-02-27 PROCEDURE — 97161 PT EVAL LOW COMPLEX 20 MIN: CPT | Mod: GP | Performed by: PHYSICAL THERAPIST

## 2019-02-27 NOTE — LETTER
[facility]  [address] [address] [place] [id number]  [phone]    2019    Re: Janet Barr   :   1960  MRN:  [id number]   REFERRING PHYSICIAN:   Alexy Rojo    [facility]    Date of Initial Evaluation:  2019  Visits:  Rxs Used: 1  Reason for Referral:     Acute pain of right shoulder  Acute pain of both shoulders    EVALUATION SUMMARY    [facility] Initial Evaluation    Subjective:  Janet Barr is a 58 year old female with a right shoulder condition.  Condition occurred with:  Unknown cause.    This is a new condition  MD order 19. Janet has been having right shoulder pain for the past couple of months.  Unable to recollect any injury associated with it but the pain got worse and she had hard time buckling up- bra or seat belt, reaching back is more painful, reaching forwards when done slowly its fine, lifting and carrying things are painful too. She met with her provider, took x ray which came out clear. She was referred to PT for further management. .    Patient reports pain:  Anterior.  Radiates to:  Upper arm.  Pain is described as shooting and is constant and reported as 8/10.  Associated symptoms:  Loss of motion/stiffness and loss of strength. Pain is worse during the night.  Symptoms are exacerbated by carrying, lifting, using arm behind back and using arm overhead   Since onset symptoms are gradually worsening.  Special tests:  X-ray.      General health as reported by patient is good.  Pertinent medical history includes:  None.  Medical allergies: no.  Other surgeries include:  Orthopedic surgery (right hip replacement).  Current medications:  Pain medication.    Employment status: self.  Employment tasks: computer.     Shoulder Evaluation:  ROM:  AROM:    Flexion:  Right:  121 degrees with pain starting at 90 degrees  Extension: Right: WFL  Abduction:  Right:  115 degrees with pain  starting at 82 degrees  Adduction:   Right:  WFL  Internal Rotation:  Right:  Back pocket with severe pain 8/10  External Rotation:  Right:  38 degrees            Re: Janet VY Corinekath   :   1960     Strength:    Flexion: Left:4+/5   Pain:    Right: 3-/5     Pain:   Extension:  Left: 5/5    Pain:    Right: 5/5    Pain:  Abduction:  Left: 4/5  Pain:    Right: 3/5     Pain:  Adduction:  Left: 5/5    Pain:    Right: 5/5     Pain:  Internal Rotation:  Left:5/5     Pain:    Right: 5/5     Pain:  External Rotation:   Left:5/5     Pain:   Right:5-/5     Pain:    Stability Testing:  normal  Special Tests:    Left shoulder positive for the following special tests:  Impingement  Right shoulder positive for the following special tests:Impingement  Palpation:    Right shoulder tenderness present at: Acrimioclavicular; Supraspinatus; Infraspinatus and Teres Minor  Mobility Tests:    Glenohumeral anterior right:  Hypomobile  Glenohumeral posterior right:  Hypomobile    Assessment/Plan:    Patient is a 58 year old female with both sides shoulder complaints.    Patient has the following significant findings with corresponding treatment plan.                Diagnosis 1:  Bilateral shoulder pain Right > Left  Pain -  hot/cold therapy, US, electric stimulation, manual therapy, STS, self management, education and home program  Decreased ROM/flexibility - manual therapy, therapeutic exercise, therapeutic activity and home program  Decreased joint mobility - manual therapy, therapeutic exercise, therapeutic activity and home program  Decreased strength - therapeutic exercise, therapeutic activities and home program  Inflammation - cold therapy, US, electric stimulation and self management/home program  Decreased function - therapeutic activities and home program  Therapy Evaluation Codes:   1) History comprised of:   Personal factors that impact the plan of care:      Time since onset of symptoms.    Comorbidity factors that  impact the plan of care are:      check HPI.     Medications impacting care: check HPI.  2) Examination of Body Systems comprised of:   Body structures and functions that impact the plan of care:      Shoulder.   Activity limitations that impact the plan of care are:      Bathing, Dressing, Lifting, Reading/Computer work and Sleeping.  3) Clinical presentation characteristics are:   Stable/Uncomplicated.  4) Decision-Making    Low complexity using standardized patient assessment instrument and/or measureable assessment of functional outcome.  Cumulative Therapy Evaluation is: Low complexity.    Re: Janet Barr   :   1960    Previous and current functional limitations:  (See Goal Flow Sheet for this information)    Short term and Long term goals: (See Goal Flow Sheet for this information)   Communication ability:  Patient appears to be able to clearly communicate and understand verbal and written communication and follow directions correctly.  Treatment Explanation - The following has been discussed with the patient:   RX ordered/plan of care  Anticipated outcomes  Possible risks and side effects  This patient would benefit from PT intervention to resume normal activities.   Rehab potential is good.    Frequency:  2 X week, once daily  Duration:  for 4 weeks  Discharge Plan:  Achieve all LTG.  Independent in home treatment program.  Reach maximal therapeutic benefit.        Thank you for your referral.    INQUIRIES  Therapist: Alda Woods PT  [address] [address]  Phone: [phone]  Fax: [phone]

## 2019-02-27 NOTE — PROGRESS NOTES
[facility] Initial Evaluation  Subjective:    Janet Barr is a 58 year old female with a right shoulder condition.  Condition occurred with:  Unknown cause.    This is a new condition  MD order 2/21/19. Janet has been having right shoulder pain for the past couple of months.  Unable to recollect any injury associated with it but the pain got worse and she had hard time buckling up- bra or seat belt, reaching back is more painful, reaching forwards when done slowly its fine, lifting and carrying things are painful too. She met with her provider, took x ray which came out clear. She was referred to PT for further management. .    Patient reports pain:  Anterior.  Radiates to:  Upper arm.  Pain is described as shooting and is constant and reported as 8/10.  Associated symptoms:  Loss of motion/stiffness and loss of strength. Pain is worse during the night.  Symptoms are exacerbated by carrying, lifting, using arm behind back and using arm overhead   Since onset symptoms are gradually worsening.  Special tests:  X-ray.      General health as reported by patient is good.  Pertinent medical history includes:  None.  Medical allergies: no.  Other surgeries include:  Orthopedic surgery (right hip replacement).  Current medications:  Pain medication.    Employment status: self.  Employment tasks: computer.                                Objective:  System                   Shoulder Evaluation:  ROM:  AROM:    Flexion:  Right:  121 degrees with pain starting at 90 degrees  Extension: Right: WFL  Abduction:  Right:  115 degrees with pain starting at 82 degrees  Adduction:   Right:  WFL  Internal Rotation:  Right:  Back pocket with severe pain 8/10  External Rotation:  Right:  38 degrees                      Strength:    Flexion: Left:4+/5   Pain:    Right: 3-/5     Pain:   Extension:  Left: 5/5    Pain:    Right: 5/5    Pain:  Abduction:  Left: 4/5  Pain:    Right: 3/5     Pain:  Adduction:   Left: 5/5    Pain:    Right: 5/5     Pain:  Internal Rotation:  Left:5/5     Pain:    Right: 5/5     Pain:  External Rotation:   Left:5/5     Pain:   Right:5-/5     Pain:            Stability Testing:  normal      Special Tests:    Left shoulder positive for the following special tests:  Impingement  Right shoulder positive for the following special tests:Impingement  Palpation:      Right shoulder tenderness present at: Acrimioclavicular; Supraspinatus; Infraspinatus and Teres Minor  Mobility Tests:    Glenohumeral anterior right:  Hypomobile  Glenohumeral posterior right:  Hypomobile                                             General     ROS    Assessment/Plan:    Patient is a 58 year old female with both sides shoulder complaints.    Patient has the following significant findings with corresponding treatment plan.                Diagnosis 1:  Bilateral shoulder pain Right > Left  Pain -  hot/cold therapy, US, electric stimulation, manual therapy, STS, self management, education and home program  Decreased ROM/flexibility - manual therapy, therapeutic exercise, therapeutic activity and home program  Decreased joint mobility - manual therapy, therapeutic exercise, therapeutic activity and home program  Decreased strength - therapeutic exercise, therapeutic activities and home program  Inflammation - cold therapy, US, electric stimulation and self management/home program  Decreased function - therapeutic activities and home program    Therapy Evaluation Codes:   1) History comprised of:   Personal factors that impact the plan of care:      Time since onset of symptoms.    Comorbidity factors that impact the plan of care are:      check HPI.     Medications impacting care: check HPI.  2) Examination of Body Systems comprised of:   Body structures and functions that impact the plan of care:      Shoulder.   Activity limitations that impact the plan of care are:      Bathing, Dressing, Lifting, Reading/Computer work  and Sleeping.  3) Clinical presentation characteristics are:   Stable/Uncomplicated.  4) Decision-Making    Low complexity using standardized patient assessment instrument and/or measureable assessment of functional outcome.  Cumulative Therapy Evaluation is: Low complexity.    Previous and current functional limitations:  (See Goal Flow Sheet for this information)    Short term and Long term goals: (See Goal Flow Sheet for this information)     Communication ability:  Patient appears to be able to clearly communicate and understand verbal and written communication and follow directions correctly.  Treatment Explanation - The following has been discussed with the patient:   RX ordered/plan of care  Anticipated outcomes  Possible risks and side effects  This patient would benefit from PT intervention to resume normal activities.   Rehab potential is good.    Frequency:  2 X week, once daily  Duration:  for 4 weeks  Discharge Plan:  Achieve all LTG.  Independent in home treatment program.  Reach maximal therapeutic benefit.    Please refer to the daily flowsheet for treatment today, total treatment time and time spent performing 1:1 timed codes.

## 2019-03-06 ENCOUNTER — THERAPY VISIT (OUTPATIENT)
Dept: PHYSICAL THERAPY | Facility: CLINIC | Age: 59
End: 2019-03-06
Payer: COMMERCIAL

## 2019-03-06 DIAGNOSIS — M25.511 ACUTE PAIN OF BOTH SHOULDERS: Primary | ICD-10-CM

## 2019-03-06 DIAGNOSIS — M25.512 ACUTE PAIN OF BOTH SHOULDERS: Primary | ICD-10-CM

## 2019-03-06 PROCEDURE — 97110 THERAPEUTIC EXERCISES: CPT | Mod: GP | Performed by: PHYSICAL THERAPIST

## 2019-03-06 PROCEDURE — 97140 MANUAL THERAPY 1/> REGIONS: CPT | Mod: GP | Performed by: PHYSICAL THERAPIST

## 2019-03-06 PROCEDURE — 97035 APP MDLTY 1+ULTRASOUND EA 15: CPT | Mod: GP | Performed by: PHYSICAL THERAPIST

## 2019-03-08 ENCOUNTER — THERAPY VISIT (OUTPATIENT)
Dept: PHYSICAL THERAPY | Facility: CLINIC | Age: 59
End: 2019-03-08
Payer: COMMERCIAL

## 2019-03-08 DIAGNOSIS — M25.512 ACUTE PAIN OF BOTH SHOULDERS: Primary | ICD-10-CM

## 2019-03-08 DIAGNOSIS — M25.511 ACUTE PAIN OF BOTH SHOULDERS: Primary | ICD-10-CM

## 2019-03-08 PROCEDURE — 97140 MANUAL THERAPY 1/> REGIONS: CPT | Mod: GP | Performed by: PHYSICAL THERAPIST

## 2019-03-08 PROCEDURE — 97110 THERAPEUTIC EXERCISES: CPT | Mod: GP | Performed by: PHYSICAL THERAPIST

## 2019-03-08 PROCEDURE — 97035 APP MDLTY 1+ULTRASOUND EA 15: CPT | Mod: GP | Performed by: PHYSICAL THERAPIST

## 2019-03-12 ENCOUNTER — THERAPY VISIT (OUTPATIENT)
Dept: PHYSICAL THERAPY | Facility: CLINIC | Age: 59
End: 2019-03-12
Payer: COMMERCIAL

## 2019-03-12 DIAGNOSIS — M25.512 ACUTE PAIN OF BOTH SHOULDERS: Primary | ICD-10-CM

## 2019-03-12 DIAGNOSIS — M25.511 ACUTE PAIN OF BOTH SHOULDERS: Primary | ICD-10-CM

## 2019-03-12 PROCEDURE — 97140 MANUAL THERAPY 1/> REGIONS: CPT | Mod: GP | Performed by: PHYSICAL THERAPIST

## 2019-03-12 PROCEDURE — 97035 APP MDLTY 1+ULTRASOUND EA 15: CPT | Mod: GP | Performed by: PHYSICAL THERAPIST

## 2019-03-12 PROCEDURE — 97112 NEUROMUSCULAR REEDUCATION: CPT | Mod: GP | Performed by: PHYSICAL THERAPIST

## 2019-05-17 NOTE — PROGRESS NOTES
Subjective:  HPI                    Objective:  System    Physical Exam    General     ROS    Assessment/Plan:    DISCHARGE REPORT    Progress reporting period is from 2/27/19 to 5/17/19.       SUBJECTIVE  Subjective changes noted by patient:   Subjective: Janet mentions her pain is better but still there.     Current pain level is  Current Pain level: 4/10.     Previous pain level was    .   Changes in function:  Yes (See Goal flowsheet attached for changes in current functional level)  Adverse reaction to treatment or activity: None    OBJECTIVE  Changes noted in objective findings:  Yes,   Objective: Improved shoulder AROM demonstrated. Working on scap stabilization and strengthening     ASSESSMENT/PLAN  Updated problem list and treatment plan: Diagnosis 1:  Shoulder pain    STG/LTGs have been met or progress has been made towards goals:  Yes (See Goal flow sheet completed today.)  Assessment of Progress: The patient's condition is improving.  The patient's condition has potential to improve.  Patient is meeting short term goals and is progressing towards long term goals.  Self Management Plans:  Patient has been instructed in a home treatment program.  Patient  has been instructed in self management of symptoms.  I have re-evaluated this patient and find that the nature, scope, duration and intensity of the therapy is appropriate for the medical condition of the patient.  Janet continues to require the following intervention to meet STG and LTG's:  PT intervention is no longer required to meet STG/LTG.    Recommendations:  This patient is ready to be discharged from therapy and continue their home treatment program.    Please refer to the daily flowsheet for treatment today, total treatment time and time spent performing 1:1 timed codes.

## 2019-08-17 DIAGNOSIS — R10.13 DYSPEPSIA: ICD-10-CM

## 2019-08-17 NOTE — TELEPHONE ENCOUNTER
"Last Written Prescription Date:  8/30/18  Last Fill Quantity: 90 capsule,  # refills: 3   Last office visit: 2/21/2019 with prescribing provider:  Alia   Future Office Visit:   Next 5 appointments (look out 90 days)    Sep 26, 2019  1:00 PM CDT  PHYSICAL with Alexy Rojo MD  [facility] [address] [facility] [id number] [phone]         Requested Prescriptions   Pending Prescriptions Disp Refills     omeprazole (PRILOSEC) 20 MG DR capsule [Pharmacy Med Name: OMEPRAZOLE DR 20 MG CAPSULE] 90 capsule 3     Sig: TAKE 1 CAPSULE BY MOUTH 30-60 MINUTES BEFORE 1ST MEAL DAILY IF NEEDED       PPI Protocol Passed - 8/17/2019  8:29 AM        Passed - Not on Clopidogrel (unless Pantoprazole ordered)        Passed - No diagnosis of osteoporosis on record        Passed - Recent (12 mo) or future (30 days) visit within the authorizing provider's specialty     Patient had office visit in the last 12 months or has a visit in the next 30 days with authorizing provider or within the authorizing provider's specialty.  See \"Patient Info\" tab in inbasket, or \"Choose Columns\" in Meds & Orders section of the refill encounter.              Passed - Medication is active on med list        Passed - Patient is age 18 or older        Passed - No active pregnacy on record        Passed - No positive pregnancy test in past 12 months          "

## 2019-08-19 NOTE — TELEPHONE ENCOUNTER
Prescription approved per [facility] Refill Protocol.  Has physical scheduled next month.  Hilda Macias RN

## 2019-09-26 ENCOUNTER — OFFICE VISIT (OUTPATIENT)
Dept: FAMILY MEDICINE | Facility: CLINIC | Age: 59
End: 2019-09-26
Payer: COMMERCIAL

## 2019-09-26 VITALS
DIASTOLIC BLOOD PRESSURE: 52 MMHG | WEIGHT: 147 LBS | HEART RATE: 61 BPM | TEMPERATURE: 98.2 F | BODY MASS INDEX: 24.46 KG/M2 | SYSTOLIC BLOOD PRESSURE: 86 MMHG | OXYGEN SATURATION: 98 %

## 2019-09-26 DIAGNOSIS — R53.83 OTHER FATIGUE: ICD-10-CM

## 2019-09-26 DIAGNOSIS — Z23 NEED FOR PROPHYLACTIC VACCINATION AND INOCULATION AGAINST INFLUENZA: ICD-10-CM

## 2019-09-26 DIAGNOSIS — R87.610 ATYPICAL SQUAMOUS CELLS OF UNDETERMINED SIGNIFICANCE ON CYTOLOGIC SMEAR OF CERVIX (ASC-US): ICD-10-CM

## 2019-09-26 DIAGNOSIS — F41.9 ANXIETY: ICD-10-CM

## 2019-09-26 DIAGNOSIS — R10.13 DYSPEPSIA: ICD-10-CM

## 2019-09-26 DIAGNOSIS — Z00.00 ROUTINE GENERAL MEDICAL EXAMINATION AT A HEALTH CARE FACILITY: Primary | ICD-10-CM

## 2019-09-26 LAB
ERYTHROCYTE [DISTWIDTH] IN BLOOD BY AUTOMATED COUNT: 13.5 % (ref 10–15)
HCT VFR BLD AUTO: 42.9 % (ref 35–47)
HGB BLD-MCNC: 14.4 G/DL (ref 11.7–15.7)
MCH RBC QN AUTO: 30.8 PG (ref 26.5–33)
MCHC RBC AUTO-ENTMCNC: 33.6 G/DL (ref 31.5–36.5)
MCV RBC AUTO: 92 FL (ref 78–100)
PLATELET # BLD AUTO: 272 10E9/L (ref 150–450)
RBC # BLD AUTO: 4.67 10E12/L (ref 3.8–5.2)
WBC # BLD AUTO: 7 10E9/L (ref 4–11)

## 2019-09-26 PROCEDURE — 80061 LIPID PANEL: CPT | Performed by: INTERNAL MEDICINE

## 2019-09-26 PROCEDURE — 85027 COMPLETE CBC AUTOMATED: CPT | Performed by: INTERNAL MEDICINE

## 2019-09-26 PROCEDURE — 90682 RIV4 VACC RECOMBINANT DNA IM: CPT | Performed by: INTERNAL MEDICINE

## 2019-09-26 PROCEDURE — 36415 COLL VENOUS BLD VENIPUNCTURE: CPT | Performed by: INTERNAL MEDICINE

## 2019-09-26 PROCEDURE — 84443 ASSAY THYROID STIM HORMONE: CPT | Performed by: INTERNAL MEDICINE

## 2019-09-26 PROCEDURE — G0145 SCR C/V CYTO,THINLAYER,RESCR: HCPCS | Performed by: INTERNAL MEDICINE

## 2019-09-26 PROCEDURE — 87624 HPV HI-RISK TYP POOLED RSLT: CPT | Performed by: INTERNAL MEDICINE

## 2019-09-26 PROCEDURE — 90471 IMMUNIZATION ADMIN: CPT | Performed by: INTERNAL MEDICINE

## 2019-09-26 PROCEDURE — 80053 COMPREHEN METABOLIC PANEL: CPT | Performed by: INTERNAL MEDICINE

## 2019-09-26 PROCEDURE — 99396 PREV VISIT EST AGE 40-64: CPT | Performed by: INTERNAL MEDICINE

## 2019-09-26 NOTE — PROGRESS NOTES
SUBJECTIVE:   CC: Janet Barr is an 59 year old woman who presents for preventive health visit.     Overall the patient is doing quite well.  She does work out.    She has been tired for the last several months.  Her sleep is okay.  She otherwise feels well.  She exercises regularly.  She is up-to-date on her colon exam and mammogram.  She has a history of an abnormal Pap smear as noted.  Follow-up since that has been negative but she does request a repeat Pap today.  No anxiety issues.    Healthy Habits:     Getting at least 3 servings of Calcium per day:  Yes    Bi-annual eye exam:  Yes    Dental care twice a year:  Yes    Sleep apnea or symptoms of sleep apnea:  None    Diet:  Regular (no restrictions)    Frequency of exercise:  2-3 days/week    Duration of exercise:  45-60 minutes    Taking medications regularly:  Yes    Barriers to taking medications:  None    Medication side effects:  None    PHQ-2 Total Score: 0    Additional concerns today:  No              Today's PHQ-2 Score:   PHQ-2 (  [employer]) 2018   Q1: Little interest or pleasure in doing things 0   Q2: Feeling down, depressed or hopeless 0   PHQ-2 Score 0       Abuse: Current or Past(Physical, Sexual or Emotional)- No  Do you feel safe in your environment? Yes    Social History     Tobacco Use     Smoking status: Former Smoker     Packs/day: 0.25     Years: 15.00     Pack years: 3.75     Types: Cigarettes     Last attempt to quit: 1999     Years since quittin.7     Smokeless tobacco: Never Used   Substance Use Topics     Alcohol use: No     If you drink alcohol do you typically have >3 drinks per day or >7 drinks per week? No                   History of Present Illness:   This patient is a 59 year old female who presents for a complete physical examination.            Past Medical History:      Past Medical History:   Diagnosis Date     Allergic rhinitis      Anxiety      ASCUS on Pap smear 11    HPV-neg     Aseptic  necrosis (H)      Helicobacter pylori (H. pylori)      History of colonoscopy 2012    nl     Low back pain     lbp for 20 years, seen 2014 by Dr. Walters     Personal history of tobacco use, presenting hazards to health dced approx 1998     Primary localized osteoarthrosis, pelvic region and thigh      Skin cancer      Unspecified functional disorder of stomach     peptic ulcer             Past Surgical History:      Past Surgical History:   Procedure Laterality Date     C NONSPECIFIC PROCEDURE  x 2    Right Hip     C NONSPECIFIC PROCEDURE  1969    adenoid removed     C NONSPECIFIC PROCEDURE      hemorrhoid op     C NONSPECIFIC PROCEDURE  2007    endometrial bx     C NONSPECIFIC PROCEDURE  2009    d & c, fibroid resection, endometrial ablation     C NONSPECIFIC PROCEDURE  2009    Right total hip arthroplasty.     COLONOSCOPY  12/10/2012    Procedure: COLONOSCOPY;  colonoscopy;  Surgeon: Alonzo Milton MD;  Location:  GI             Social History:     Social History     Socioeconomic History     Marital status:      Spouse name: Not on file     Number of children: 2     Years of education: Not on file     Highest education level: Not on file   Occupational History     Occupation: cleaning service     Employer: SELF   Social Needs     Financial resource strain: Not on file     Food insecurity:     Worry: Not on file     Inability: Not on file     Transportation needs:     Medical: Not on file     Non-medical: Not on file   Tobacco Use     Smoking status: Former Smoker     Packs/day: 0.25     Years: 15.00     Pack years: 3.75     Types: Cigarettes     Last attempt to quit: 1999     Years since quittin.7     Smokeless tobacco: Never Used   Substance and Sexual Activity     Alcohol use: No     Drug use: No     Sexual activity: Yes   Lifestyle     Physical activity:     Days per week: Not on file     Minutes per session: Not on file     Stress: Not on file   Relationships     Social connections:      Talks on phone: Not on file     Gets together: Not on file     Attends Gnosticist service: Not on file     Active member of club or organization: Not on file     Attends meetings of clubs or organizations: Not on file     Relationship status: Not on file     Intimate partner violence:     Fear of current or ex partner: Not on file     Emotionally abused: Not on file     Physically abused: Not on file     Forced sexual activity: Not on file   Other Topics Concern     Parent/sibling w/ CABG, MI or angioplasty before 65F 55M? Not Asked   Social History Narrative     Not on file             Family History:   Reviewed          Immunizations:     Immunization History   Administered Date(s) Administered     Influenza Vaccine IM > 6 months Valent IIV4 09/20/2018     TDAP Vaccine (Adacel) 05/21/2013            Allergies:     Allergies   Allergen Reactions     Celecoxib      gi     Codeine      headache              Medications:   amoxicillin (AMOXIL) 500 MG capsule, Take 4 pills 1 hour prior to dental work  Multiple Vitamins-Minerals (MULTIVITAL PO), Take  by mouth.  LORazepam (ATIVAN) 0.5 MG tablet, Take 0.5-1 tablets (0.25-0.5 mg) by mouth every 6 hours as needed for anxiety Take 30 minutes prior to departure.  Do not operate a vehicle after taking this medication (Patient not taking: Reported on 9/26/2019)    No current facility-administered medications on file prior to visit.             Review of Systems:   The 10 point Review of Systems is negative other than noted in the HPI           Physical Exam:   Vitals were reviewed  Blood pressure (!) 86/52, pulse 61, temperature 98.2  F (36.8  C), temperature source Oral, weight 66.7 kg (147 lb), SpO2 98 %, not currently breastfeeding.    Exam:  Constitutional: healthy appearing, alert and in no distress  Heent: Normocephalic. Head without obvious masses or lesions. PERRLDC, EOMI. Mouth exam within normal limits: tongue, mucous membranes, posterior pharynx all normal, no  lesions or abnormalities seen.  Tm's and canals within normal limits bilaterally. Neck supple, no nuchal rigidity or masses. No supraclavicular, or cervical adenopathy. Thyroid symmetric, no masses.  Cardiovascular: Regular rate and rhythm, no murmer, rub or gallops.  JVP not elevated, no edema.  Carotids within normal limits bilaterally, no bruits.  Respiratory: Normal respiratory effort.  Lungs clear, normal flow, no wheezing or crackles.  Breasts: Normal bilaterally.  No masses or lesions.  Nipples within normal limites.  No axillary lesions or nodes.  My M.A. Was present during this part of the examination.  Gastrointestinal: Normal active bowel sounds.   Soft, not tender, no masses, guarding or rebound.  No hepatosplenomegaly.   Pelvic: External genitalia within normal limits.  No masses or lesions.  Speculum exam unremarkable.  Cervix intact, no lesions. No significant discharge seen. Bimanual exam within normal limits.  No abnormal masses felt and no tenderness.  Recto-vaginal exam within normal limits.  Exam chaperoned by nurse.  Musculoskeletal: extremities normal, no gross deformities noted.  Skin: no suspicious lesions or rashes   Neurologic: Mental status within normal limits.  Speech fluent.  No gross motor abnormalities and gait intact.  Psychiatric: mentation appears normal and affect normal.         Data:   Labs sent        Assessment:   1. Normal complete physical exam  2. Abnormal pap, follow up done  3. Fatigue, doubt pathologic, check labs  4. Anxiety, no issues  5. Dyspepsia, no issues  6. hcm         Plan:   Letter with labs  Up to date mammogram, colon  Exercise, diet      Alexy Rojo MD

## 2019-09-26 NOTE — PATIENT INSTRUCTIONS
I would recommend getting the new shingles shot called shingrix, but I would do it at your pharmacy as they can check with the insurance company to see if it is paid for.      Alexy Rojo M.D.

## 2019-09-26 NOTE — LETTER
1. Breath stacking will be started next visit  2. Follow up in 6 months  3. If they have fever over 100.4 start Tamiflu    Courtney Ponce MD    Pediatric Department  Division of Pediatric Pulmonology and Sleep Medicine    Pager # [phone]  Email: [email or web address]     [place] [address] [facility]  [address]  [place]  [address]  Tel: [phone]    September 27, 2019    Janetyari Barr  [address]  [place] [address]        Dear Ms. Barr,    It was a pleasure seeing you for your physical examination.  I wanted to get back to you with your test results.  I have enclosed a copy for your review.     I am happy to report that your cbc or complete blood count is normal with no signs of anemia, leukemia or platelet abnormalities. Your chemistry panel shows no signs of diabetes.  Your blood salts, kidney tests, liver tests, thyroid test, and proteins are all fine.    Your total cholesterol is 216 with the normal range being below 200.  Your HDL or good cholesterol is 74 with the normal range being above 50.  Your LDL or bad cholesterol is 120 with the normal range being below 130.  To have a large amount of HDL cholesterol overall I think the numbers are fine.  Please be sure to exercise and eat a healthy diet.    I am happy to bring you this excellent report.  If you have any questions let me know.    Alexy Rojo M.D./[facility]        Enclosure: Lab Results  Results for orders placed or performed in visit on 09/26/19   CBC with platelets   Result Value Ref Range    WBC 7.0 4.0 - 11.0 10e9/L    RBC Count 4.67 3.8 - 5.2 10e12/L    Hemoglobin 14.4 11.7 - 15.7 g/dL    Hematocrit 42.9 35.0 - 47.0 %    MCV 92 78 - 100 fl    MCH 30.8 26.5 - 33.0 pg    MCHC 33.6 31.5 - 36.5 g/dL    RDW 13.5 10.0 - 15.0 %    Platelet Count 272 150 - 450 10e9/L   Comprehensive metabolic panel   Result Value Ref Range    Sodium 136 133 - 144 mmol/L    Potassium 4.7 3.4 - 5.3 mmol/L    Chloride 104 94 - 109 mmol/L    Carbon Dioxide 27 20 - 32 mmol/L    Anion Gap 5 3 - 14 mmol/L    Glucose 83 70 - 99 mg/dL    Urea Nitrogen 19 7 - 30 mg/dL    Creatinine 0.78 0.52 - 1.04 mg/dL    GFR Estimate 83 >60 mL/min/[1.73_m2]    GFR Estimate If Black >90 >60 mL/min/[1.73_m2]    Calcium 9.3 8.5  - 10.1 mg/dL    Bilirubin Total 0.4 0.2 - 1.3 mg/dL    Albumin 3.9 3.4 - 5.0 g/dL    Protein Total 6.8 6.8 - 8.8 g/dL    Alkaline Phosphatase 94 40 - 150 U/L    ALT 26 0 - 50 U/L    AST 16 0 - 45 U/L   Lipid panel reflex to direct LDL Non-fasting   Result Value Ref Range    Cholesterol 216 (H) <200 mg/dL    Triglycerides 108 <150 mg/dL    HDL Cholesterol 74 >49 mg/dL    LDL Cholesterol Calculated 120 (H) <100 mg/dL    Non HDL Cholesterol 142 (H) <130 mg/dL   TSH with free T4 reflex   Result Value Ref Range    TSH 1.00 0.40 - 4.00 mU/L

## 2019-09-27 LAB
ALBUMIN SERPL-MCNC: 3.9 G/DL (ref 3.4–5)
ALP SERPL-CCNC: 94 U/L (ref 40–150)
ALT SERPL W P-5'-P-CCNC: 26 U/L (ref 0–50)
ANION GAP SERPL CALCULATED.3IONS-SCNC: 5 MMOL/L (ref 3–14)
AST SERPL W P-5'-P-CCNC: 16 U/L (ref 0–45)
BILIRUB SERPL-MCNC: 0.4 MG/DL (ref 0.2–1.3)
BUN SERPL-MCNC: 19 MG/DL (ref 7–30)
CALCIUM SERPL-MCNC: 9.3 MG/DL (ref 8.5–10.1)
CHLORIDE SERPL-SCNC: 104 MMOL/L (ref 94–109)
CHOLEST SERPL-MCNC: 216 MG/DL
CO2 SERPL-SCNC: 27 MMOL/L (ref 20–32)
CREAT SERPL-MCNC: 0.78 MG/DL (ref 0.52–1.04)
GFR SERPL CREATININE-BSD FRML MDRD: 83 ML/MIN/{1.73_M2}
GLUCOSE SERPL-MCNC: 83 MG/DL (ref 70–99)
HDLC SERPL-MCNC: 74 MG/DL
LDLC SERPL CALC-MCNC: 120 MG/DL
NONHDLC SERPL-MCNC: 142 MG/DL
POTASSIUM SERPL-SCNC: 4.7 MMOL/L (ref 3.4–5.3)
PROT SERPL-MCNC: 6.8 G/DL (ref 6.8–8.8)
SODIUM SERPL-SCNC: 136 MMOL/L (ref 133–144)
TRIGL SERPL-MCNC: 108 MG/DL
TSH SERPL DL<=0.005 MIU/L-ACNC: 1 MU/L (ref 0.4–4)

## 2019-09-27 NOTE — RESULT ENCOUNTER NOTE
It was a pleasure seeing you for your physical examination.  I wanted to get back to you with your test results.  I have enclosed a copy for your review.     I am happy to report that your cbc or complete blood count is normal with no signs of anemia, leukemia or platelet abnormalities. Your chemistry panel shows no signs of diabetes.  Your blood salts, kidney tests, liver tests, thyroid test, and proteins are all fine.    Your total cholesterol is 216 with the normal range being below 200.  Your HDL or good cholesterol is 74 with the normal range being above 50.  Your LDL or bad cholesterol is 120 with the normal range being below 130.  To have a large amount of HDL cholesterol overall I think the numbers are fine.  Please be sure to exercise and eat a healthy diet.    I am happy to bring you this excellent report.  If you have any questions let me know.    Alexy Rojo M.D.

## 2019-10-01 LAB
COPATH REPORT: NORMAL
PAP: NORMAL

## 2019-11-04 ENCOUNTER — HEALTH MAINTENANCE LETTER (OUTPATIENT)
Age: 59
End: 2019-11-04

## 2019-11-14 ENCOUNTER — HOSPITAL ENCOUNTER (OUTPATIENT)
Dept: MAMMOGRAPHY | Facility: CLINIC | Age: 59
Discharge: HOME | End: 2019-11-14
Attending: INTERNAL MEDICINE | Admitting: INTERNAL MEDICINE
Payer: COMMERCIAL

## 2019-11-14 DIAGNOSIS — Z12.31 OTHER SCREENING MAMMOGRAM: ICD-10-CM

## 2019-11-14 PROCEDURE — 77063 BREAST TOMOSYNTHESIS BI: CPT

## 2020-09-03 DIAGNOSIS — Z91.89 AT RISK FOR DENTAL PROBLEMS: ICD-10-CM

## 2020-09-04 RX ORDER — AMOXICILLIN 500 MG/1
CAPSULE ORAL
Qty: 4 CAPSULE | Refills: 3 | OUTPATIENT
Start: 2020-09-04

## 2020-09-04 NOTE — TELEPHONE ENCOUNTER
Routing refill request to provider for review/approval because:  Drug not on the [facility] refill protocol     TO PCP: will pre-dental abx treatment still be indicated for this patient?       Please review and authorize if appropriate,     Thank you,   Nydia BECKER RN

## 2020-09-04 NOTE — TELEPHONE ENCOUNTER
Left message for pt to call back to clinic so triage can explain.  Refused refill to pharm with note.  Emily Cruz RN

## 2020-09-10 NOTE — TELEPHONE ENCOUNTER
Spoke with Pt    She states surgery was 11 years ago, she is agreeable to avoiding abx treatment prior to dental appointments     Nydia FOUNTAIN RN

## 2020-10-20 ENCOUNTER — TELEPHONE (OUTPATIENT)
Dept: FAMILY MEDICINE | Facility: CLINIC | Age: 60
End: 2020-10-20
Payer: COMMERCIAL

## 2020-10-20 NOTE — TELEPHONE ENCOUNTER
PCP,    Pt had mammogram today and discussed pain in left breast. They suggest f/u with you and request US of left breast.   Last OV was September 2019. Would you like visit to discuss?    Thank you,  Ricky KNOX RN

## 2020-10-20 NOTE — TELEPHONE ENCOUNTER
Reason for Call: Request for an order     Order or referral being requested: Ultrasound for left breast    Date needed: as soon as possible    Has the patient been seen by the PCP for this problem? unknown    Additional comments: Patient went in for mammogram and discussed pain in left breast, she was told to f/u with PCP and request order for Ultrasound    Phone number Patient can be reached at:  Cell number on file:    Telephone Information:   Mobile [phone]       Best Time:  any    Can we leave a detailed message on this number?  YES    Call taken on 10/20/2020 at 1:47 PM by Barbie Matthews

## 2020-10-20 NOTE — TELEPHONE ENCOUNTER
I or one of my partners should see her for a breast exam and also get a breast us.  Please have her see me for this in near future.  I believe I have more open spots now, if not let me know    Alexy Rojo M.D.

## 2020-10-21 NOTE — TELEPHONE ENCOUNTER
Detailed message left on mobile VM to call and schedule clinic visit with  or one of his partners. (PCP has openings 10/27/20 at this time)    Also called home no. (bold in demographics) to let her know she has a detailed message on her mobile phone.  Kierra Garland RN on 10/21/2020 at 10:30 AM

## 2020-10-27 ENCOUNTER — OFFICE VISIT (OUTPATIENT)
Dept: FAMILY MEDICINE | Facility: CLINIC | Age: 60
End: 2020-10-27
Payer: COMMERCIAL

## 2020-10-27 VITALS
HEART RATE: 60 BPM | OXYGEN SATURATION: 97 % | BODY MASS INDEX: 24.99 KG/M2 | TEMPERATURE: 97.5 F | SYSTOLIC BLOOD PRESSURE: 84 MMHG | WEIGHT: 150 LBS | DIASTOLIC BLOOD PRESSURE: 52 MMHG | HEIGHT: 65 IN

## 2020-10-27 DIAGNOSIS — N64.4 BREAST PAIN, LEFT: Primary | ICD-10-CM

## 2020-10-27 DIAGNOSIS — Z23 NEED FOR PROPHYLACTIC VACCINATION AND INOCULATION AGAINST INFLUENZA: ICD-10-CM

## 2020-10-27 PROCEDURE — 90471 IMMUNIZATION ADMIN: CPT | Performed by: INTERNAL MEDICINE

## 2020-10-27 PROCEDURE — 99213 OFFICE O/P EST LOW 20 MIN: CPT | Mod: 25 | Performed by: INTERNAL MEDICINE

## 2020-10-27 PROCEDURE — 90682 RIV4 VACC RECOMBINANT DNA IM: CPT | Performed by: INTERNAL MEDICINE

## 2020-10-27 ASSESSMENT — MIFFLIN-ST. JEOR: SCORE: 1251.28

## 2020-10-27 NOTE — PROGRESS NOTES
The patient presents for left breast discomfort for 3 to 4 weeks.  No trauma or new activities.  No nipple discharge.  Nothing on the right side.  Moves around the bed.  No history of breast disease and no family history of breast disease.  She has not felt ill.  No chest pain or shortness of breath.    Past Medical History:   Diagnosis Date     Allergic rhinitis      Anxiety      ASCUS on Pap smear 4/28/11    HPV-neg     Aseptic necrosis (H)      Helicobacter pylori (H. pylori)      History of colonoscopy 2012    nl     Low back pain     lbp for 20 years, seen 2014 by Dr. Walters     Personal history of tobacco use, presenting hazards to health dced approx 1998     Primary localized osteoarthrosis, pelvic region and thigh      Skin cancer      Unspecified functional disorder of stomach     peptic ulcer     Past Surgical History:   Procedure Laterality Date     COLONOSCOPY  12/10/2012    Procedure: COLONOSCOPY;  colonoscopy;  Surgeon: Alonzo Milton MD;  Location:  GI     ZZC NONSPECIFIC PROCEDURE  x 2    Right Hip     ZZC NONSPECIFIC PROCEDURE  1969    adenoid removed     ZZC NONSPECIFIC PROCEDURE      hemorrhoid op     ZZC NONSPECIFIC PROCEDURE  2007    endometrial bx     ZZC NONSPECIFIC PROCEDURE  2009    d & c, fibroid resection, endometrial ablation     ZZC NONSPECIFIC PROCEDURE  2009    Right total hip arthroplasty.     Social History     Socioeconomic History     Marital status:      Spouse name: Not on file     Number of children: 2     Years of education: Not on file     Highest education level: Not on file   Occupational History     Occupation: cleaning service     Employer: SELF   Social Needs     Financial resource strain: Not on file     Food insecurity     Worry: Not on file     Inability: Not on file     Transportation needs     Medical: Not on file     Non-medical: Not on file   Tobacco Use     Smoking status: Former Smoker     Packs/day: 0.25     Years: 15.00     Pack years: 3.75      "Types: Cigarettes     Quit date: 1999     Years since quittin.8     Smokeless tobacco: Never Used   Substance and Sexual Activity     Alcohol use: No     Drug use: No     Sexual activity: Yes   Lifestyle     Physical activity     Days per week: Not on file     Minutes per session: Not on file     Stress: Not on file   Relationships     Social connections     Talks on phone: Not on file     Gets together: Not on file     Attends Hoahaoism service: Not on file     Active member of club or organization: Not on file     Attends meetings of clubs or organizations: Not on file     Relationship status: Not on file     Intimate partner violence     Fear of current or ex partner: Not on file     Emotionally abused: Not on file     Physically abused: Not on file     Forced sexual activity: Not on file   Other Topics Concern     Parent/sibling w/ CABG, MI or angioplasty before 65F 55M? Not Asked   Social History Narrative     Not on file     Current Outpatient Medications   Medication Sig Dispense Refill     amoxicillin (AMOXIL) 500 MG capsule Take 4 pills 1 hour prior to dental work 4 capsule 3     Multiple Vitamins-Minerals (MULTIVITAL PO) Take  by mouth.       omeprazole (PRILOSEC) 20 MG DR capsule TAKE 1 CAPSULE BY MOUTH 30-60 MINUTES BEFORE 1ST MEAL DAILY IF NEEDED 90 capsule 1     Allergies   Allergen Reactions     Celecoxib      gi     Codeine      headache      FAMILY HISTORY NOTED AND REVIEWED    REVIEW OF SYSTEMS: above    PHYSICAL EXAM    BP (!) 84/52   Pulse 60   Temp 97.5  F (36.4  C) (Tympanic)   Ht 1.651 m (5' 5\")   Wt 68 kg (150 lb)   SpO2 97%   BMI 24.96 kg/m      Patient appears non toxic  bilat breast exam within normal limits, no masses or lesions, somewhat tender over left upper breast area, lateral side, nipples within normal limits, no ax lymphad.    ASSESSMENT:  Breast discomfort, neg exam, will check mammogram and us, she has follow up with me Nov, I doubt ca    Alexy Rojo, " M.D.

## 2020-11-02 ENCOUNTER — HOSPITAL ENCOUNTER (OUTPATIENT)
Dept: MAMMOGRAPHY | Facility: CLINIC | Age: 60
End: 2020-11-02
Attending: INTERNAL MEDICINE
Payer: COMMERCIAL

## 2020-11-02 DIAGNOSIS — N64.4 BREAST PAIN, LEFT: ICD-10-CM

## 2020-11-02 PROCEDURE — 76642 ULTRASOUND BREAST LIMITED: CPT | Mod: LT

## 2020-11-02 PROCEDURE — G0279 TOMOSYNTHESIS, MAMMO: HCPCS

## 2020-11-16 ENCOUNTER — HEALTH MAINTENANCE LETTER (OUTPATIENT)
Age: 60
End: 2020-11-16

## 2021-01-11 NOTE — PROGRESS NOTES
SUBJECTIVE:   CC: Janet Barr is an 60 year old woman who presents for preventive health visit.     She feels fine, not working out a lot but some.  No c/o and prior breast discomfort resolved.  She wants to have pap smear.      Patient has been advised of split billing requirements and indicates understanding: Yes  Healthy Habits:    Getting at least 3 servings of Calcium per day:  Yes    Bi-annual eye exam:  Yes    Dental care twice a year:  Yes    Sleep apnea or symptoms of sleep apnea:  None    Diet:  Regular (no restrictions)    Frequency of exercise:  None    Duration of exercise:  N/A    Taking medications regularly:  Yes    Barriers to taking medications:  None    Medication side effects:  None    PHQ-2 Total Score:    Additional concerns today:  No              Today's PHQ-2 Score:   PHQ-2 ( 1999 Pfizer) 10/27/2020   Q1: Little interest or pleasure in doing things 0   Q2: Feeling down, depressed or hopeless 0   PHQ-2 Score 0       Abuse: Current or Past (Physical, Sexual or Emotional) - No  Do you feel safe in your environment? Yes    Have you ever done Advance Care Planning? (For example, a Health Directive, POLST, or a discussion with a medical provider or your loved ones about your wishes): No, advance care planning information given to patient to review.  Patient plans to discuss their wishes with loved ones or provider.                     History of Present Illness:   This patient is a 60 year old female who presents for a complete physical examination.            Past Medical History:      Past Medical History:   Diagnosis Date     Allergic rhinitis      Anxiety      ASCUS on Pap smear 4/28/11    HPV-neg     Aseptic necrosis (H)      Helicobacter pylori (H. pylori)      History of colonoscopy 2012    nl     Low back pain     lbp for 20 years, seen 2014 by Dr. Walters     Personal history of tobacco use, presenting hazards to health dced approx 1998     Primary localized osteoarthrosis,  pelvic region and thigh      Skin cancer      Unspecified functional disorder of stomach     peptic ulcer             Past Surgical History:      Past Surgical History:   Procedure Laterality Date     COLONOSCOPY  12/10/2012    Procedure: COLONOSCOPY;  colonoscopy;  Surgeon: Alonzo Milton MD;  Location: [facility]     Z NONSPECIFIC PROCEDURE  x 2    Right Hip     ZZC NONSPECIFIC PROCEDURE  1969    adenoid removed     ZZC NONSPECIFIC PROCEDURE      hemorrhoid op     ZZC NONSPECIFIC PROCEDURE  2007    endometrial bx     ZZC NONSPECIFIC PROCEDURE  2009    d & c, fibroid resection, endometrial ablation     ZZC NONSPECIFIC PROCEDURE  2009    Right total hip arthroplasty.             Social History:     Social History     Socioeconomic History     Marital status:      Spouse name: Not on file     Number of children: 2     Years of education: Not on file     Highest education level: Not on file   Occupational History     Occupation: cleaning service     Employer: SELF   Social Needs     Financial resource strain: Not on file     Food insecurity     Worry: Not on file     Inability: Not on file     Transportation needs     Medical: Not on file     Non-medical: Not on file   Tobacco Use     Smoking status: Former Smoker     Packs/day: 0.25     Years: 15.00     Pack years: 3.75     Types: Cigarettes     Quit date: 1999     Years since quittin.0     Smokeless tobacco: Never Used   Substance and Sexual Activity     Alcohol use: No     Drug use: No     Sexual activity: Yes   Lifestyle     Physical activity     Days per week: Not on file     Minutes per session: Not on file     Stress: Not on file   Relationships     Social connections     Talks on phone: Not on file     Gets together: Not on file     Attends Denominational service: Not on file     Active member of club or organization: Not on file     Attends meetings of clubs or organizations: Not on file     Relationship status: Not on file     Intimate partner  "violence     Fear of current or ex partner: Not on file     Emotionally abused: Not on file     Physically abused: Not on file     Forced sexual activity: Not on file   Other Topics Concern     Parent/sibling w/ CABG, MI or angioplasty before 65F 55M? Not Asked   Social History Narrative     Not on file             Family History:   Reviewed          Immunizations:     Immunization History   Administered Date(s) Administered     Influenza Quad, Recombinant, p-free (RIV4) 09/26/2019, 10/27/2020     Influenza Vaccine IM > 6 months Valent IIV4 09/20/2018     TDAP Vaccine (Adacel) 05/21/2013            Allergies:     Allergies   Allergen Reactions     Celecoxib      gi     Codeine      headache              Medications:        Multiple Vitamins-Minerals (MULTIVITAL PO), Take  by mouth.       amoxicillin (AMOXIL) 500 MG capsule, Take 4 pills 1 hour prior to dental work (Patient not taking: Reported on 1/12/2021)    No current facility-administered medications on file prior to visit.             Review of Systems:   The 10 point Review of Systems is negative other than noted in the HPI           Physical Exam:   Vitals were reviewed  Blood pressure 92/60, pulse 59, temperature 97  F (36.1  C), temperature source Oral, height 1.651 m (5' 5\"), weight 68 kg (150 lb), SpO2 100 %, not currently breastfeeding.    Exam:  Constitutional: healthy appearing, alert and in no distress  Heent: Normocephalic. Head without obvious masses or lesions. PERRLDC, EOMI. Mouth exam within normal limits: tongue, mucous membranes, posterior pharynx all normal, no lesions or abnormalities seen.  Tm's and canals within normal limits bilaterally. Neck supple, no nuchal rigidity or masses. No supraclavicular, or cervical adenopathy. Thyroid symmetric, no masses.  Cardiovascular: Regular rate and rhythm, no murmer, rub or gallops.  JVP not elevated, no edema.  Carotids within normal limits bilaterally, no bruits.  Respiratory: Normal respiratory " effort.  Lungs clear, normal flow, no wheezing or crackles.  Breasts: done last ov  Gastrointestinal: Normal active bowel sounds.   Soft, not tender, no masses, guarding or rebound.  No hepatosplenomegaly.   Pelvic: External genitalia within normal limits.  No masses or lesions.  Speculum exam unremarkable.  Cervix intact, no lesions. No significant discharge seen. Bimanual exam within normal limits.  No abnormal masses felt and no tenderness.  Recto-vaginal exam within normal limits.  Exam chaperoned by nurse.  Musculoskeletal: extremities normal, no gross deformities noted.  Skin: no suspicious lesions or rashes   Neurologic: Mental status within normal limits.  Speech fluent.  No gross motor abnormalities and gait intact.  Psychiatric: mentation appears normal and affect normal.         Data:   Labs sent        Assessment:   1. Normal complete physical exam  2. Ascus, follow up fine, patient wanted pap today  3. Dyspepsia, doing fine with prn ppi  4. hcm         Plan:   shingrix  Letter with labs  Exercise, diet  Up to date colon and mammogram      Alexy Rojo MD

## 2021-01-12 ENCOUNTER — OFFICE VISIT (OUTPATIENT)
Dept: FAMILY MEDICINE | Facility: CLINIC | Age: 61
End: 2021-01-12
Payer: COMMERCIAL

## 2021-01-12 VITALS
TEMPERATURE: 97 F | OXYGEN SATURATION: 100 % | DIASTOLIC BLOOD PRESSURE: 60 MMHG | WEIGHT: 150 LBS | HEIGHT: 65 IN | BODY MASS INDEX: 24.99 KG/M2 | SYSTOLIC BLOOD PRESSURE: 92 MMHG | HEART RATE: 59 BPM

## 2021-01-12 DIAGNOSIS — Z23 NEED FOR VACCINATION: ICD-10-CM

## 2021-01-12 DIAGNOSIS — R87.610 ATYPICAL SQUAMOUS CELLS OF UNDETERMINED SIGNIFICANCE ON CYTOLOGIC SMEAR OF CERVIX (ASC-US): ICD-10-CM

## 2021-01-12 DIAGNOSIS — Z00.00 ROUTINE GENERAL MEDICAL EXAMINATION AT A HEALTH CARE FACILITY: Primary | ICD-10-CM

## 2021-01-12 DIAGNOSIS — R10.13 DYSPEPSIA: ICD-10-CM

## 2021-01-12 LAB
ALBUMIN SERPL-MCNC: 3.8 G/DL (ref 3.4–5)
ALP SERPL-CCNC: 75 U/L (ref 40–150)
ALT SERPL W P-5'-P-CCNC: 29 U/L (ref 0–50)
ANION GAP SERPL CALCULATED.3IONS-SCNC: 4 MMOL/L (ref 3–14)
AST SERPL W P-5'-P-CCNC: 23 U/L (ref 0–45)
BILIRUB SERPL-MCNC: 0.5 MG/DL (ref 0.2–1.3)
BUN SERPL-MCNC: 18 MG/DL (ref 7–30)
CALCIUM SERPL-MCNC: 9.4 MG/DL (ref 8.5–10.1)
CHLORIDE SERPL-SCNC: 109 MMOL/L (ref 94–109)
CHOLEST SERPL-MCNC: 263 MG/DL
CO2 SERPL-SCNC: 27 MMOL/L (ref 20–32)
CREAT SERPL-MCNC: 0.79 MG/DL (ref 0.52–1.04)
ERYTHROCYTE [DISTWIDTH] IN BLOOD BY AUTOMATED COUNT: 13.2 % (ref 10–15)
GFR SERPL CREATININE-BSD FRML MDRD: 81 ML/MIN/{1.73_M2}
GLUCOSE SERPL-MCNC: 96 MG/DL (ref 70–99)
HCT VFR BLD AUTO: 43.9 % (ref 35–47)
HDLC SERPL-MCNC: 84 MG/DL
HGB BLD-MCNC: 14.4 G/DL (ref 11.7–15.7)
LDLC SERPL CALC-MCNC: 159 MG/DL
MCH RBC QN AUTO: 30.2 PG (ref 26.5–33)
MCHC RBC AUTO-ENTMCNC: 32.8 G/DL (ref 31.5–36.5)
MCV RBC AUTO: 92 FL (ref 78–100)
NONHDLC SERPL-MCNC: 179 MG/DL
PLATELET # BLD AUTO: 264 10E9/L (ref 150–450)
POTASSIUM SERPL-SCNC: 4.1 MMOL/L (ref 3.4–5.3)
PROT SERPL-MCNC: 7.1 G/DL (ref 6.8–8.8)
RBC # BLD AUTO: 4.77 10E12/L (ref 3.8–5.2)
SODIUM SERPL-SCNC: 140 MMOL/L (ref 133–144)
TRIGL SERPL-MCNC: 102 MG/DL
WBC # BLD AUTO: 5.1 10E9/L (ref 4–11)

## 2021-01-12 PROCEDURE — G0145 SCR C/V CYTO,THINLAYER,RESCR: HCPCS | Performed by: INTERNAL MEDICINE

## 2021-01-12 PROCEDURE — 85027 COMPLETE CBC AUTOMATED: CPT | Performed by: INTERNAL MEDICINE

## 2021-01-12 PROCEDURE — 90471 IMMUNIZATION ADMIN: CPT | Performed by: INTERNAL MEDICINE

## 2021-01-12 PROCEDURE — 99396 PREV VISIT EST AGE 40-64: CPT | Performed by: INTERNAL MEDICINE

## 2021-01-12 PROCEDURE — 80061 LIPID PANEL: CPT | Performed by: INTERNAL MEDICINE

## 2021-01-12 PROCEDURE — 36415 COLL VENOUS BLD VENIPUNCTURE: CPT | Performed by: INTERNAL MEDICINE

## 2021-01-12 PROCEDURE — 80053 COMPREHEN METABOLIC PANEL: CPT | Performed by: INTERNAL MEDICINE

## 2021-01-12 PROCEDURE — 90750 HZV VACC RECOMBINANT IM: CPT | Performed by: INTERNAL MEDICINE

## 2021-01-12 ASSESSMENT — MIFFLIN-ST. JEOR: SCORE: 1251.28

## 2021-01-12 NOTE — LETTER
January 13, 2021      Janet Barr  [address]  [place] [address]        Dear ,    It was a pleasure seeing you for your physical examination.  I wanted to get back to you with your test results.  I have enclosed a copy for your review.     I am happy to report that your cbc or complete blood count is normal with no signs of anemia, leukemia or platelet abnormalities. Your chemistry panel shows no signs of diabetes.  Your blood salts, kidney tests, liver tests, and proteins are all fine.     Your total cholesterol is 263 with the normal range being below 200.  Your HDL or good cholesterol is 84 with the normal range being above 50.  Your LDL or bad cholesterol is 159 with the normal range being below 130.  With the hdl being so high overall the numbers are fine.  Please be sure to exercise and eat a healthy diet for this.     I am happy to bring you this overall excellent report.  If you have any questions please call me.     Alexy Rojo M.D.     Resulted Orders   CBC with platelets   Result Value Ref Range    WBC 5.1 4.0 - 11.0 10e9/L    RBC Count 4.77 3.8 - 5.2 10e12/L    Hemoglobin 14.4 11.7 - 15.7 g/dL    Hematocrit 43.9 35.0 - 47.0 %    MCV 92 78 - 100 fl    MCH 30.2 26.5 - 33.0 pg    MCHC 32.8 31.5 - 36.5 g/dL    RDW 13.2 10.0 - 15.0 %    Platelet Count 264 150 - 450 10e9/L   Comprehensive metabolic panel   Result Value Ref Range    Sodium 140 133 - 144 mmol/L    Potassium 4.1 3.4 - 5.3 mmol/L    Chloride 109 94 - 109 mmol/L    Carbon Dioxide 27 20 - 32 mmol/L    Anion Gap 4 3 - 14 mmol/L    Glucose 96 70 - 99 mg/dL      Comment:      Fasting specimen    Urea Nitrogen 18 7 - 30 mg/dL    Creatinine 0.79 0.52 - 1.04 mg/dL    GFR Estimate 81 >60 mL/min/[1.73_m2]      Comment:      Non  GFR Calc  Starting 12/18/2018, serum creatinine based estimated GFR (eGFR) will be   calculated using the Chronic Kidney Disease Epidemiology Collaboration   (CKD-EPI) equation.       GFR Estimate If Black >90 >60 mL/min/[1.73_m2]      Comment:       GFR Calc  Starting 12/18/2018, serum creatinine based estimated GFR (eGFR) will be   calculated using the Chronic Kidney Disease Epidemiology Collaboration   (CKD-EPI) equation.      Calcium 9.4 8.5 - 10.1 mg/dL    Bilirubin Total 0.5 0.2 - 1.3 mg/dL    Albumin 3.8 3.4 - 5.0 g/dL    Protein Total 7.1 6.8 - 8.8 g/dL    Alkaline Phosphatase 75 40 - 150 U/L    ALT 29 0 - 50 U/L    AST 23 0 - 45 U/L   Lipid panel reflex to direct LDL Non-fasting   Result Value Ref Range    Cholesterol 263 (H) <200 mg/dL      Comment:      Desirable:       <200 mg/dl    Triglycerides 102 <150 mg/dL      Comment:      Fasting specimen    HDL Cholesterol 84 >49 mg/dL    LDL Cholesterol Calculated 159 (H) <100 mg/dL      Comment:      Above desirable:  100-129 mg/dl  Borderline High:  130-159 mg/dL  High:             160-189 mg/dL  Very high:       >189 mg/dl      Non HDL Cholesterol 179 (H) <130 mg/dL      Comment:      Above Desirable:  130-159 mg/dl  Borderline high:  160-189 mg/dl  High:             190-219 mg/dl  Very high:       >219 mg/dl

## 2021-01-12 NOTE — PROGRESS NOTES
Prior to immunization administration, verified patients identity using patient s name and date of birth. Please see Immunization Activity for additional information.     Screening Questionnaire for Adult Immunization    Are you sick today?   No   Do you have allergies to medications, food, a vaccine component or latex?   No   Have you ever had a serious reaction after receiving a vaccination?   No   Do you have a long-term health problem with heart, lung, kidney, or metabolic disease (e.g., diabetes), asthma, a blood disorder, no spleen, complement component deficiency, a cochlear implant, or a spinal fluid leak?  Are you on long-term aspirin therapy?   No   Do you have cancer, leukemia, HIV/AIDS, or any other immune system problem?   No   Do you have a parent, brother, or sister with an immune system problem?   No   In the past 3 months, have you taken medications that affect  your immune system, such as prednisone, other steroids, or anticancer drugs; drugs for the treatment of rheumatoid arthritis, Crohn s disease, or psoriasis; or have you had radiation treatments?   No   Have you had a seizure, or a brain or other nervous system problem?   No   During the past year, have you received a transfusion of blood or blood    products, or been given immune (gamma) globulin or antiviral drug?   No   For women: Are you pregnant or is there a chance you could become       pregnant during the next month?   No   Have you received any vaccinations in the past 4 weeks?   No     Immunization questionnaire answers were all negative.        Per orders of Dr. Rojo, injection of Shingrix  given by Shelly Mackay CMA. Patient instructed to remain in clinic for 15 minutes afterwards, and to report any adverse reaction to me immediately.       Screening performed by Shelly Mackay CMA on 1/12/2021 at 10:47 AM.

## 2021-01-13 NOTE — RESULT ENCOUNTER NOTE
It was a pleasure seeing you for your physical examination.  I wanted to get back to you with your test results.  I have enclosed a copy for your review.     I am happy to report that your cbc or complete blood count is normal with no signs of anemia, leukemia or platelet abnormalities. Your chemistry panel shows no signs of diabetes.  Your blood salts, kidney tests, liver tests, and proteins are all fine.    Your total cholesterol is 263 with the normal range being below 200.  Your HDL or good cholesterol is 84 with the normal range being above 50.  Your LDL or bad cholesterol is 159 with the normal range being below 130.  With the hdl being so high overall the numbers are fine.  Please be sure to exercise and eat a healthy diet for this.    I am happy to bring you this overall excellent report.  If you have any questions please call me.    Alexy Rojo M.D.

## 2021-01-14 LAB
COPATH REPORT: NORMAL
PAP: NORMAL

## 2021-09-18 ENCOUNTER — HEALTH MAINTENANCE LETTER (OUTPATIENT)
Age: 61
End: 2021-09-18

## 2022-01-06 ENCOUNTER — HOSPITAL ENCOUNTER (OUTPATIENT)
Dept: MAMMOGRAPHY | Facility: CLINIC | Age: 62
Discharge: HOME | End: 2022-01-06
Attending: INTERNAL MEDICINE | Admitting: INTERNAL MEDICINE
Payer: COMMERCIAL

## 2022-01-06 DIAGNOSIS — Z12.31 VISIT FOR SCREENING MAMMOGRAM: ICD-10-CM

## 2022-01-06 PROCEDURE — 77067 SCR MAMMO BI INCL CAD: CPT

## 2022-03-05 ENCOUNTER — HEALTH MAINTENANCE LETTER (OUTPATIENT)
Age: 62
End: 2022-03-05

## 2022-03-11 ENCOUNTER — OFFICE VISIT (OUTPATIENT)
Dept: FAMILY MEDICINE | Facility: CLINIC | Age: 62
End: 2022-03-11
Payer: COMMERCIAL

## 2022-03-11 VITALS
HEIGHT: 65 IN | OXYGEN SATURATION: 99 % | BODY MASS INDEX: 23.99 KG/M2 | WEIGHT: 144 LBS | TEMPERATURE: 97.7 F | SYSTOLIC BLOOD PRESSURE: 98 MMHG | RESPIRATION RATE: 16 BRPM | DIASTOLIC BLOOD PRESSURE: 65 MMHG | HEART RATE: 58 BPM

## 2022-03-11 DIAGNOSIS — M25.512 BILATERAL SHOULDER PAIN, UNSPECIFIED CHRONICITY: ICD-10-CM

## 2022-03-11 DIAGNOSIS — M25.511 BILATERAL SHOULDER PAIN, UNSPECIFIED CHRONICITY: ICD-10-CM

## 2022-03-11 DIAGNOSIS — Z23 NEED FOR VACCINATION: Primary | ICD-10-CM

## 2022-03-11 DIAGNOSIS — R87.610 ATYPICAL SQUAMOUS CELLS OF UNDETERMINED SIGNIFICANCE ON CYTOLOGIC SMEAR OF CERVIX (ASC-US): ICD-10-CM

## 2022-03-11 DIAGNOSIS — L65.9 HAIR LOSS: ICD-10-CM

## 2022-03-11 DIAGNOSIS — E83.52 HIGH CALCIUM LEVELS: ICD-10-CM

## 2022-03-11 DIAGNOSIS — Z00.00 ENCOUNTER FOR ANNUAL PHYSICAL EXAM: ICD-10-CM

## 2022-03-11 LAB
BASOPHILS # BLD AUTO: 0 10E3/UL (ref 0–0.2)
BASOPHILS NFR BLD AUTO: 0 %
CRP SERPL-MCNC: <2.9 MG/L (ref 0–8)
EOSINOPHIL # BLD AUTO: 0.1 10E3/UL (ref 0–0.7)
EOSINOPHIL NFR BLD AUTO: 1 %
ERYTHROCYTE [DISTWIDTH] IN BLOOD BY AUTOMATED COUNT: 13.2 % (ref 10–15)
ERYTHROCYTE [SEDIMENTATION RATE] IN BLOOD BY WESTERGREN METHOD: 6 MM/HR (ref 0–30)
HCT VFR BLD AUTO: 44.1 % (ref 35–47)
HGB BLD-MCNC: 14.7 G/DL (ref 11.7–15.7)
LYMPHOCYTES # BLD AUTO: 1.3 10E3/UL (ref 0.8–5.3)
LYMPHOCYTES NFR BLD AUTO: 22 %
MCH RBC QN AUTO: 30.8 PG (ref 26.5–33)
MCHC RBC AUTO-ENTMCNC: 33.3 G/DL (ref 31.5–36.5)
MCV RBC AUTO: 92 FL (ref 78–100)
MONOCYTES # BLD AUTO: 0.6 10E3/UL (ref 0–1.3)
MONOCYTES NFR BLD AUTO: 9 %
NEUTROPHILS # BLD AUTO: 4 10E3/UL (ref 1.6–8.3)
NEUTROPHILS NFR BLD AUTO: 67 %
PLATELET # BLD AUTO: 269 10E3/UL (ref 150–450)
RBC # BLD AUTO: 4.78 10E6/UL (ref 3.8–5.2)
WBC # BLD AUTO: 5.9 10E3/UL (ref 4–11)

## 2022-03-11 PROCEDURE — 82306 VITAMIN D 25 HYDROXY: CPT | Performed by: INTERNAL MEDICINE

## 2022-03-11 PROCEDURE — 99213 OFFICE O/P EST LOW 20 MIN: CPT | Mod: 25 | Performed by: INTERNAL MEDICINE

## 2022-03-11 PROCEDURE — 82627 DEHYDROEPIANDROSTERONE: CPT | Performed by: INTERNAL MEDICINE

## 2022-03-11 PROCEDURE — 80050 GENERAL HEALTH PANEL: CPT | Performed by: INTERNAL MEDICINE

## 2022-03-11 PROCEDURE — 87624 HPV HI-RISK TYP POOLED RSLT: CPT | Performed by: INTERNAL MEDICINE

## 2022-03-11 PROCEDURE — 99396 PREV VISIT EST AGE 40-64: CPT | Mod: 25 | Performed by: INTERNAL MEDICINE

## 2022-03-11 PROCEDURE — 86140 C-REACTIVE PROTEIN: CPT | Performed by: INTERNAL MEDICINE

## 2022-03-11 PROCEDURE — 82728 ASSAY OF FERRITIN: CPT | Performed by: INTERNAL MEDICINE

## 2022-03-11 PROCEDURE — 90471 IMMUNIZATION ADMIN: CPT | Performed by: INTERNAL MEDICINE

## 2022-03-11 PROCEDURE — 36415 COLL VENOUS BLD VENIPUNCTURE: CPT | Performed by: INTERNAL MEDICINE

## 2022-03-11 PROCEDURE — 83550 IRON BINDING TEST: CPT | Performed by: INTERNAL MEDICINE

## 2022-03-11 PROCEDURE — 80061 LIPID PANEL: CPT | Performed by: INTERNAL MEDICINE

## 2022-03-11 PROCEDURE — 84270 ASSAY OF SEX HORMONE GLOBUL: CPT | Performed by: INTERNAL MEDICINE

## 2022-03-11 PROCEDURE — 84403 ASSAY OF TOTAL TESTOSTERONE: CPT | Performed by: INTERNAL MEDICINE

## 2022-03-11 PROCEDURE — 90750 HZV VACC RECOMBINANT IM: CPT | Performed by: INTERNAL MEDICINE

## 2022-03-11 PROCEDURE — 85652 RBC SED RATE AUTOMATED: CPT | Performed by: INTERNAL MEDICINE

## 2022-03-11 PROCEDURE — G0145 SCR C/V CYTO,THINLAYER,RESCR: HCPCS | Performed by: INTERNAL MEDICINE

## 2022-03-11 ASSESSMENT — ENCOUNTER SYMPTOMS
ARTHRALGIAS: 1
NAUSEA: 1

## 2022-03-11 ASSESSMENT — PAIN SCALES - GENERAL: PAINLEVEL: NO PAIN (0)

## 2022-03-11 NOTE — PROGRESS NOTES
SUBJECTIVE:   CC: Janet Barr is an 61 year old woman who presents for preventive health visit.     Overall she is doing well but has a couple of issues.    The patient at 3 months has had bilateral shoulder discomfort.  It can wake her.  She has it during the day but a bit less.  No jaw claudication, headaches, fevers or night sweats.  She has lost slight weight and has not been trying to.    She has had hair loss and was seen by her dermatologist who requested multiple labs which I have ordered.    She wants a Pap smear.    She otherwise is doing well and has no complaints.  She exercises a bit.        Healthy Habits:     Getting at least 3 servings of Calcium per day:  Yes    Bi-annual eye exam:  Yes    Dental care twice a year:  Yes    Sleep apnea or symptoms of sleep apnea:  None    Diet:  Regular (no restrictions)    Duration of exercise:  15-30 minutes    Taking medications regularly:  Not Applicable    Medication side effects:  None    PHQ-2 Total Score: 0    Additional concerns today:  No              Today's PHQ-2 Score:   PHQ-2 ( 1999 Pfizer) 3/11/2022   Q1: Little interest or pleasure in doing things 0   Q2: Feeling down, depressed or hopeless 0   PHQ-2 Score 0   PHQ-2 Total Score (12-17 Years)- Positive if 3 or more points; Administer PHQ-A if positive -   Q1: Little interest or pleasure in doing things Not at all   Q2: Feeling down, depressed or hopeless Not at all   PHQ-2 Score 0       Abuse: Current or Past (Physical, Sexual or Emotional) - No  Do you feel safe in your environment? Yes                   History of Present Illness:   This patient is a 61 year old female who presents for a complete physical examination.            Past Medical History:      Past Medical History:   Diagnosis Date     Allergic rhinitis      Anxiety      ASCUS on Pap smear 4/28/11    HPV-neg     Aseptic necrosis (H)      Helicobacter pylori (H. pylori)      History of colonoscopy 2012    nl     Low back pain      lbp for 20 years, seen 2014 by Dr. Walters     Personal history of tobacco use, presenting hazards to health dced approx 1998     Primary localized osteoarthrosis, pelvic region and thigh      Skin cancer      Unspecified functional disorder of stomach     peptic ulcer             Past Surgical History:      Past Surgical History:   Procedure Laterality Date     COLONOSCOPY  12/10/2012    Procedure: COLONOSCOPY;  colonoscopy;  Surgeon: Alonzo Milton MD;  Location: [facility]     ZZC NONSPECIFIC PROCEDURE  x 2    Right Hip     ZZC NONSPECIFIC PROCEDURE  1969    adenoid removed     ZZC NONSPECIFIC PROCEDURE      hemorrhoid op     ZZC NONSPECIFIC PROCEDURE  2007    endometrial bx     ZZC NONSPECIFIC PROCEDURE  2009    d & c, fibroid resection, endometrial ablation     ZZC NONSPECIFIC PROCEDURE  2009    Right total hip arthroplasty.             Social History:     Social History     Socioeconomic History     Marital status:      Spouse name: Not on file     Number of children: 2     Years of education: Not on file     Highest education level: Not on file   Occupational History     Occupation: cleaning service     Employer: SELF   Tobacco Use     Smoking status: Former Smoker     Packs/day: 0.25     Years: 15.00     Pack years: 3.75     Types: Cigarettes     Quit date: 1999     Years since quittin.2     Smokeless tobacco: Never Used   Substance and Sexual Activity     Alcohol use: No     Drug use: No     Sexual activity: Yes   Other Topics Concern     Parent/sibling w/ CABG, MI or angioplasty before 65F 55M? Not Asked   Social History Narrative     Not on file     Social Determinants of Health     Financial Resource Strain: Not on file   Food Insecurity: Not on file   Transportation Needs: Not on file   Physical Activity: Not on file   Stress: Not on file   Social Connections: Not on file   Intimate Partner Violence: Not on file   Housing Stability: Not on file             Family History:   Reviewed  "         Immunizations:     Immunization History   Administered Date(s) Administered     COVID-19,PF,Moderna 02/11/2021, 03/10/2021     Flu, Unspecified 11/11/1997     Influenza (IIV3) PF 11/11/1997     Influenza Quad, Recombinant, pf(RIV4) (Flublok) 09/26/2019, 10/27/2020     Influenza Vaccine IM > 6 months Valent IIV4 (Alfuria,Fluzone) 09/20/2018     TDAP Vaccine (Adacel) 05/21/2013     Zoster vaccine recombinant adjuvanted (SHINGRIX) 01/12/2021            Allergies:     Allergies   Allergen Reactions     Celecoxib      gi     Codeine      headache              Medications:   No current outpatient medications on file prior to visit.  No current facility-administered medications on file prior to visit.            Review of Systems:   The 10 point Review of Systems is negative other than noted in the HPI           Physical Exam:   Vitals were reviewed  Blood pressure 98/65, pulse 58, temperature 97.7  F (36.5  C), temperature source Temporal, resp. rate 16, height 1.651 m (5' 5\"), weight 65.3 kg (144 lb), SpO2 99 %, not currently breastfeeding.    Exam:  Constitutional: healthy appearing, alert and in no distress  Heent: Normocephalic. Head without obvious masses or lesions. PERRLDC, EOMI. Mouth exam within normal limits: tongue, mucous membranes, posterior pharynx all normal, no lesions or abnormalities seen.  Tm's and canals within normal limits bilaterally. Neck supple, no nuchal rigidity or masses. No supraclavicular, or cervical adenopathy. Thyroid symmetric, no masses.  Cardiovascular: Regular rate and rhythm, no murmer, rub or gallops.  JVP not elevated, no edema.  Carotids within normal limits bilaterally, no bruits.  Respiratory: Normal respiratory effort.  Lungs clear, normal flow, no wheezing or crackles.  Breasts: Normal bilaterally.  No masses or lesions.  Nipples within normal limites.  No axillary lesions or nodes.  My M.A. Was present during this part of the examination.  Gastrointestinal: Normal " active bowel sounds.   Soft, not tender, no masses, guarding or rebound.  No hepatosplenomegaly.   Pelvic: External genitalia within normal limits.  No masses or lesions.  Speculum exam unremarkable.  Cervix intact, no lesions. No significant discharge seen. Bimanual exam within normal limits.  No abnormal masses felt and no tenderness.  Recto-vaginal exam within normal limits.  Exam chaperoned by nurse.  Musculoskeletal: extremities normal, no gross deformities noted.  Skin: no suspicious lesions or rashes   Neurologic: Mental status within normal limits.  Speech fluent.  No gross motor abnormalities and gait intact.  Psychiatric: mentation appears normal and affect normal.         Data:   Labs sent        Assessment:   1. Normal complete physical exam  2. Shoulder pain, may be djd, need to r/o pmr, doubt temp arteritis  3. Hair loss  4. Ascus  5. hcm         Plan:   Did not want covid booster  shingrix shot  Exercise, diet  Letter with labs  If esr and crp normal consider trial of nsaid vs ortho        Alexy Rojo MD

## 2022-03-12 LAB
ALBUMIN SERPL-MCNC: 4 G/DL (ref 3.4–5)
ALP SERPL-CCNC: 69 U/L (ref 40–150)
ALT SERPL W P-5'-P-CCNC: 34 U/L (ref 0–50)
ANION GAP SERPL CALCULATED.3IONS-SCNC: 6 MMOL/L (ref 3–14)
AST SERPL W P-5'-P-CCNC: 23 U/L (ref 0–45)
BILIRUB SERPL-MCNC: 0.5 MG/DL (ref 0.2–1.3)
BUN SERPL-MCNC: 22 MG/DL (ref 7–30)
CALCIUM SERPL-MCNC: 10.5 MG/DL (ref 8.5–10.1)
CHLORIDE BLD-SCNC: 108 MMOL/L (ref 94–109)
CHOLEST SERPL-MCNC: 256 MG/DL
CO2 SERPL-SCNC: 26 MMOL/L (ref 20–32)
CREAT SERPL-MCNC: 0.84 MG/DL (ref 0.52–1.04)
FASTING STATUS PATIENT QL REPORTED: YES
FERRITIN SERPL-MCNC: 80 NG/ML (ref 8–252)
GFR SERPL CREATININE-BSD FRML MDRD: 79 ML/MIN/1.73M2
GLUCOSE BLD-MCNC: 96 MG/DL (ref 70–99)
HDLC SERPL-MCNC: 89 MG/DL
IRON SATN MFR SERPL: 34 % (ref 15–46)
IRON SERPL-MCNC: 91 UG/DL (ref 35–180)
LDLC SERPL CALC-MCNC: 146 MG/DL
NONHDLC SERPL-MCNC: 167 MG/DL
POTASSIUM BLD-SCNC: 4.6 MMOL/L (ref 3.4–5.3)
PROT SERPL-MCNC: 7.3 G/DL (ref 6.8–8.8)
SODIUM SERPL-SCNC: 140 MMOL/L (ref 133–144)
TIBC SERPL-MCNC: 270 UG/DL (ref 240–430)
TRIGL SERPL-MCNC: 107 MG/DL
TSH SERPL DL<=0.005 MIU/L-ACNC: 1.35 MU/L (ref 0.4–4)

## 2022-03-14 LAB
DEPRECATED CALCIDIOL+CALCIFEROL SERPL-MC: 62 UG/L (ref 20–75)
DHEA-S SERPL-MCNC: 38 UG/DL (ref 35–430)
SHBG SERPL-SCNC: 97 NMOL/L (ref 30–135)

## 2022-03-15 LAB
TESTOST FREE SERPL-MCNC: 0.25 NG/DL
TESTOST SERPL-MCNC: 30 NG/DL (ref 8–60)
TESTOST SERPL-MCNC: 38 NG/DL (ref 8–60)

## 2022-03-16 LAB
BKR LAB AP GYN ADEQUACY: NORMAL
BKR LAB AP GYN INTERPRETATION: NORMAL
BKR LAB AP HPV REFLEX: NORMAL
BKR LAB AP PREVIOUS ABNL DX: NORMAL
BKR LAB AP PREVIOUS ABNORMAL: NORMAL
PATH REPORT.COMMENTS IMP SPEC: NORMAL
PATH REPORT.COMMENTS IMP SPEC: NORMAL
PATH REPORT.RELEVANT HX SPEC: NORMAL

## 2022-03-17 NOTE — RESULT ENCOUNTER NOTE
Mrs. Barr, it was a pleasure seeing you for your physical.  I wanted to get back to you with your test results which you should be able to review.    I am happy to report that your CBC your complete blood count is normal with no signs of anemia or blood disorders.    Your chemistry panel shows no diabetes.  Other than the slightly high calcium your blood salts, kidney test, liver tests, and proteins are all normal.    Your total cholesterol is 256.  However, your HDL or good cholesterol is wonderful at 89 and your LDL or bad is fine at 146.  I do not believe you need medication but would recommend exercise and a healthy diet for this.    Other normal tests include your total and free testosterone, your vitamin D level, your sex hormone binding globulin, your DHEA sulfate level, your thyroid test, and your iron studies.  Your tests for inflammation are also normal called the CRP and sed rate.    The only test that is slightly off is your calcium level.  I am not sure if this is significant as sometimes numbers can go up and down without any particular cause.  However, it is important that we monitor this and I would like to repeat it to see if it is real.  If it is elevated there are multiple things that can do it and I would like to follow-up on this.  I would asked that you come back for a repeat calcium level in 1 to 2 weeks.  You do not need to see me or fast for this but schedule it on my chart.        If you have any questions let me know.    Alexy Rojo M.D.

## 2022-03-18 LAB
HUMAN PAPILLOMA VIRUS 16 DNA: POSITIVE
HUMAN PAPILLOMA VIRUS 18 DNA: NEGATIVE
HUMAN PAPILLOMA VIRUS FINAL DIAGNOSIS: ABNORMAL
HUMAN PAPILLOMA VIRUS OTHER HR: NEGATIVE

## 2022-03-21 ENCOUNTER — PATIENT OUTREACH (OUTPATIENT)
Dept: FAMILY MEDICINE | Facility: CLINIC | Age: 62
End: 2022-03-21
Payer: COMMERCIAL

## 2022-03-22 ENCOUNTER — TELEPHONE (OUTPATIENT)
Dept: OBGYN | Facility: CLINIC | Age: 62
End: 2022-03-22
Payer: COMMERCIAL

## 2022-03-22 NOTE — TELEPHONE ENCOUNTER
[facility] Call Center    Phone Message    May a detailed message be left on voicemail: yes     Reason for Call: Other: pt calling and needs to reschedule her coloscopy, and wants to do it in April if possible, please advise with her     Action Taken: Message routed to:  [facility] p [phone]    Travel Screening: Not Applicable

## 2022-03-22 NOTE — TELEPHONE ENCOUNTER
Spoke with patient - female providers in [place] are booked out quite a ways. Dr. Calle doesn't have openings in April. She requested to be added to a wait list - this has been done. Offered patient appointment with Dr. Nicholas in  as well as appts with male providers in , but declined. She states she will look up the doctors online and will call us back if she wants to change the May 9th appointment.

## 2022-03-23 ENCOUNTER — TELEPHONE (OUTPATIENT)
Dept: OBGYN | Facility: CLINIC | Age: 62
End: 2022-03-23
Payer: COMMERCIAL

## 2022-03-23 NOTE — TELEPHONE ENCOUNTER
[facility] Call Center    Phone Message    May a detailed message be left on voicemail: yes     Reason for Call: Other: needs to reschedule her coloscopy     Action Taken: Message routed to:  [facility] p [phone]    Travel Screening: Not Applicable

## 2022-04-06 ENCOUNTER — TELEPHONE (OUTPATIENT)
Dept: FAMILY MEDICINE | Facility: CLINIC | Age: 62
End: 2022-04-06
Payer: COMMERCIAL

## 2022-04-06 NOTE — TELEPHONE ENCOUNTER
Please call the patient to remind her to come back in for the repeat labs.    Thank you    Alexy Rojo M.D.

## 2022-04-07 ENCOUNTER — OFFICE VISIT (OUTPATIENT)
Dept: OBGYN | Facility: CLINIC | Age: 62
End: 2022-04-07
Payer: COMMERCIAL

## 2022-04-07 VITALS
HEART RATE: 53 BPM | OXYGEN SATURATION: 100 % | SYSTOLIC BLOOD PRESSURE: 115 MMHG | BODY MASS INDEX: 23.8 KG/M2 | WEIGHT: 143 LBS | DIASTOLIC BLOOD PRESSURE: 77 MMHG

## 2022-04-07 DIAGNOSIS — R87.810 CERVICAL HIGH RISK HPV (HUMAN PAPILLOMAVIRUS) TEST POSITIVE: Primary | ICD-10-CM

## 2022-04-07 PROCEDURE — 99202 OFFICE O/P NEW SF 15 MIN: CPT | Mod: 25 | Performed by: OBSTETRICS & GYNECOLOGY

## 2022-04-07 PROCEDURE — 57452 EXAM OF CERVIX W/SCOPE: CPT | Performed by: OBSTETRICS & GYNECOLOGY

## 2022-04-07 NOTE — PROGRESS NOTES
Janet presents for colposcopy. Pap showed: Normal HR HPV+.     PMH/PSH/Meds/Allergies reviewed & documented in [facility].    I discussed with the patient the results of her pap smear and/or HPV studies.    I discussed our current understanding of abnormal cytology and the role hpv can play in pre-cancerous cervical change.  I explained the current cytological/histologic terminology.      We discussed the screening nature of pap smears and the need for a more definitive examination.    She is given the opportunity to ask questions and have them answered.  She does agree to proceed with colposcopy.    Procedure for colposcopy and biopsy has been explained to the   patient and consent obtained.    PROCEDURE:  Speculum placed in vagina and excellent visualization of cervix achieved, cervix swabbed  with acetic acid solution.    FINDINGS:  Cervix: no visible lesions, no mosaicism, no punctation and no abnormal vasculature; SCJ visualized 360 degrees without lesions and no biopsies taken.    Vaginal inspection: vaginal colposcopy not performed.    Vulvar colposcopy: vulvar colposcopy not performed.    Procedure Summary: Patient tolerated procedure well and colposcopy adequate.    Colposcopic Impression: HPV effect    15 minutes of time was spent in discussion regarding her pap and HPV status.  Thiswas in addition to the time required for the procedure.   Plan:  Co-testing 1 year.    Geoff Bridges MD FACOG

## 2022-04-11 NOTE — TELEPHONE ENCOUNTER
Pt called back and is scheduled for lab only appointment. She has no further questions or concerns.     Araseli NGUYỄN RN  [facility]

## 2022-04-11 NOTE — TELEPHONE ENCOUNTER
Triage Patient Outreach    Attempt # 1    Was call answered?  No.  Left message on voicemail with information to call back    When patient calls back: Anyone can schedule pt for lab appt. Next avail that pt can make.       Emily Cruz, RN  [facility] RN Triage Team

## 2022-05-04 ENCOUNTER — PATIENT OUTREACH (OUTPATIENT)
Dept: FAMILY MEDICINE | Facility: CLINIC | Age: 62
End: 2022-05-04
Payer: COMMERCIAL

## 2022-05-04 DIAGNOSIS — R87.810 CERVICAL HIGH RISK HPV (HUMAN PAPILLOMAVIRUS) TEST POSITIVE: ICD-10-CM

## 2022-05-05 ENCOUNTER — LAB (OUTPATIENT)
Dept: LAB | Facility: CLINIC | Age: 62
End: 2022-05-05
Payer: COMMERCIAL

## 2022-05-05 DIAGNOSIS — E83.52 HIGH CALCIUM LEVELS: ICD-10-CM

## 2022-05-05 LAB — CA-I BLD-MCNC: 5 MG/DL (ref 4.4–5.2)

## 2022-05-05 PROCEDURE — 82330 ASSAY OF CALCIUM: CPT

## 2022-05-05 PROCEDURE — 36415 COLL VENOUS BLD VENIPUNCTURE: CPT

## 2022-05-05 NOTE — RESULT ENCOUNTER NOTE
Good news, the repeat calcium is normal so no problems.    If you have any questions please call me.    Alexy Rojo M.D.

## 2022-08-27 ENCOUNTER — MYC MEDICAL ADVICE (OUTPATIENT)
Dept: FAMILY MEDICINE | Facility: CLINIC | Age: 62
End: 2022-08-27

## 2022-08-30 NOTE — TELEPHONE ENCOUNTER
Per  OV 3/11/22  She has had hair loss and was seen by her dermatologist who requested multiple labs which I have ordered.    Labs ordered:       Team can you please fax lab results from 3/11/22 to Dr. Salter's office per patient request?    Woodrow Church RN  [facility]

## 2022-09-15 ENCOUNTER — NURSE TRIAGE (OUTPATIENT)
Dept: NURSING | Facility: CLINIC | Age: 62
End: 2022-09-15

## 2022-09-15 ENCOUNTER — NURSE TRIAGE (OUTPATIENT)
Dept: FAMILY MEDICINE | Facility: CLINIC | Age: 62
End: 2022-09-15

## 2022-09-15 NOTE — TELEPHONE ENCOUNTER
Reason for Call:  appointment    Detailed comments: PT has had chest/sore throat for 3-4 months. Would like to try to get in sooner than 1.2023 appt with pcp    Phone Number Patient can be reached at: Cell number on file:    Telephone Information:   Mobile [phone]       Best Time: anytime    Can we leave a detailed message on this number? Yes    Call taken on 9/15/2022 at 11:12 AM by Lucy Mcneal

## 2022-09-15 NOTE — TELEPHONE ENCOUNTER
Routing to TRIAGE - patient report chest/sore throat.      Karoline CRAD MA on 9/15/2022 at 1:13 PM

## 2022-09-15 NOTE — TELEPHONE ENCOUNTER
Patient calling back saying she received a call from Dr. Ludwig's office. She was hoping it was a call to say there was a cancelled appointment for her as she is on a waiting list.    RN tried to catch the nurses at the clinic but it was now closed.      Patient is refusing triage but would like someone to call her back in the morning.    Kira Ho RN  [facility]

## 2022-09-16 NOTE — TELEPHONE ENCOUNTER
Call to patient. Patient informed of SHON Evans's below response. Patient verbalized understanding and will follow plan of care.     Sole Meyer RN BSN MSN  [facility]

## 2022-09-16 NOTE — TELEPHONE ENCOUNTER
"Call to patient. See triage below.       S-(situation): Chest pain    B-(background): 62 year old female. Reports father passed away from heart disease. Denies any heart diease history herself. Not on any scheduled medications. Last saw Dr. Rojo in clinic on 3/11/2022 for OV.     A-(assessment):   Chest pain \"discomfort from throat to chest.\"   Worse with exertion.   Experiences \"heavy breathing\" with exertion which improves with rest.  Cough with heavy breathing.   Chest discomfort \"every day\" for the past 4 months. \"It lasts a while.\"  Reports metal taste in her mouth when she experiences chest discomfort. Patient states: \"I feel some taste in my mouth. Strange taste. It's a dry, metal taste.\"  Denies radiation, dizziness, sweating, nausea, vomiting.   Patient wonders if this could be related to a mold issue with her  in her home. She wonders if she has a mold allergy.   Patient denies being told she has ever had GERD.     R-(recommendations): ED/UCC or office visit today with team?   Patient reports she is feeling well this morning. She denies any discomfort at this time. Patient does not want to go to ED/UCC. She reports she wants to see her provider in clinic about this.     Sole Meyer RN BSN [facility]    Reason for Disposition    Difficulty breathing     Denies difficulty breathing at this time, has had \"heavy breathing\" in the past with chest pain    Chest pain lasting longer than 5 minutes and occurred in last 3 days (72 hours) (Exception: feels exactly the same as previously diagnosed heartburn and has accompanying sour taste in mouth)    Additional Information    Negative: SEVERE difficulty breathing (e.g., struggling for each breath, speaks in single words)    Negative: Passed out (i.e., fainted, collapsed and was not responding)    Negative: Difficult to awaken or acting confused (e.g., disoriented, slurred speech)    Negative: Shock suspected (e.g., " cold/pale/clammy skin, too weak to stand, low BP, rapid pulse)    Negative: Chest pain lasting longer than 5 minutes and ANY of the following:* Over 44 years old* Over 30 years old and at least one cardiac risk factor (e.g., diabetes mellitus, high blood pressure, high cholesterol, smoker, or strong family history of heart disease)* History of heart disease (i.e., angina, heart attack, heart failure, bypass surgery, takes nitroglycerin)* Pain is crushing, pressure-like, or heavy    Negative: Heart beating < 50 beats per minute OR > 140 beats per minute    Negative: Visible sweat on face or sweat dripping down face    Negative: Sounds like a life-threatening emergency to the triager    Negative: Followed an injury to chest    Negative: SEVERE chest pain    Negative: Pain also in shoulder(s) or arm(s) or jaw    Negative: Cocaine use within last 3 days    Negative: Major surgery in the past month    Negative: Hip or leg fracture (broken bone) in past month (or had cast on leg or ankle in past month)    Negative: Illness requiring prolonged bedrest in past month (e.g., immobilization, long hospital stay)    Negative: Long-distance travel in past month (e.g., car, bus, train, plane; with trip lasting 6 or more hours)    Negative: History of prior 'blood clot' in leg or lungs (i.e., deep vein thrombosis, pulmonary embolism)    Negative: History of inherited increased risk of blood clots (e.g., Factor 5 Leiden, Anti-thrombin 3, Protein C or Protein S deficiency, Prothrombin mutation)    Negative: Cancer treatment in the past two months (or has cancer now)    Negative: Heart beating irregularly or very rapidly    Negative: Chest pain or 'angina' comes and goes and is happening more often (increasing in frequency) or getting worse (increasing in severity) (Exception: chest pains that last only a few seconds)    Negative: Dizziness or lightheadedness    Negative: Coughing up blood    Negative: Patient sounds very sick or weak  "to the triager    Negative: Patient says chest pain feels exactly the same as previously diagnosed 'heartburn' and describes burning in chest and accompanying sour taste in mouth    Negative: Fever > 100.4 F (38.0 C)    Negative: Rash in same area as pain (may be described as 'small blisters')    Answer Assessment - Initial Assessment Questions  1. LOCATION: \"Where does it hurt?\"        \"From the throat goes down to the chest.\"  2. RADIATION: \"Does the pain go anywhere else?\" (e.g., into neck, jaw, arms, back)      Denies radiation  3. ONSET: \"When did the chest pain begin?\" (Minutes, hours or days)       4 months ago  4. PATTERN \"Does the pain come and go, or has it been constant since it started?\"  \"Does it get worse with exertion?\"       \"It's every day but it's not all the time. It comes and goes.\" Worse with exertion.   5. DURATION: \"How long does it last\" (e.g., seconds, minutes, hours)      \"It will take a while.\"  6. SEVERITY: \"How bad is the pain?\"  (e.g., Scale 1-10; mild, moderate, or severe)     - MILD (1-3): doesn't interfere with normal activities      - MODERATE (4-7): interferes with normal activities or awakens from sleep     - SEVERE (8-10): excruciating pain, unable to do any normal activities          \"I feel some taste in my mouth. Strange taste. It's a dry, metal taste.\"  \"It's not like severe pain, it's more uncomfortable.\"    7. CARDIAC RISK FACTORS: \"Do you have any history of heart problems or risk factors for heart disease?\" (e.g., angina, prior heart attack; diabetes, high blood pressure, high cholesterol, smoker, or strong family history of heart disease)      \"My father passed aware from heart disease.\"  8. PULMONARY RISK FACTORS: \"Do you have any history of lung disease?\"  (e.g., blood clots in lung, asthma, emphysema, birth control pills)      No per patient   9. CAUSE: \"What do you think is causing the chest pain?\"     Mold in the . Patient wonders if this is related. If " "she has an allergy to mold?  10. OTHER SYMPTOMS: \"Do you have any other symptoms?\" (e.g., dizziness, nausea, vomiting, sweating, fever, difficulty breathing, cough)        Strange taste in the mouth, difficulty breathing, cough- just when I walk \"I start to feel little heavy breathe. It's better when I stop and rest.\"  11. PREGNANCY: \"Is there any chance you are pregnant?\" \"When was your last menstrual period?\"        N/A    Protocols used: CHEST PAIN-A-[place]      "

## 2022-09-16 NOTE — TELEPHONE ENCOUNTER
Provider Response to 2nd Level Triage Request    I have reviewed the RN documentation. My recommendation is:  Refer to ED     Agree w/ED (more appropriate than UC in this case), thanks.

## 2022-09-20 ENCOUNTER — HOSPITAL ENCOUNTER (EMERGENCY)
Facility: CLINIC | Age: 62
Discharge: HOME | End: 2022-09-20
Attending: EMERGENCY MEDICINE | Admitting: EMERGENCY MEDICINE
Payer: COMMERCIAL

## 2022-09-20 ENCOUNTER — APPOINTMENT (OUTPATIENT)
Dept: GENERAL RADIOLOGY | Facility: CLINIC | Age: 62
End: 2022-09-20
Attending: EMERGENCY MEDICINE
Payer: COMMERCIAL

## 2022-09-20 VITALS
SYSTOLIC BLOOD PRESSURE: 100 MMHG | BODY MASS INDEX: 22.49 KG/M2 | TEMPERATURE: 98.5 F | OXYGEN SATURATION: 98 % | HEART RATE: 54 BPM | RESPIRATION RATE: 14 BRPM | WEIGHT: 135 LBS | DIASTOLIC BLOOD PRESSURE: 63 MMHG | HEIGHT: 65 IN

## 2022-09-20 DIAGNOSIS — R06.09 CHRONIC DYSPNEA: ICD-10-CM

## 2022-09-20 DIAGNOSIS — G89.29 CHRONIC CHEST PAIN: ICD-10-CM

## 2022-09-20 DIAGNOSIS — R07.9 CHRONIC CHEST PAIN: ICD-10-CM

## 2022-09-20 LAB
ALBUMIN SERPL-MCNC: 3.5 G/DL (ref 3.4–5)
ALP SERPL-CCNC: 75 U/L (ref 40–150)
ALT SERPL W P-5'-P-CCNC: 29 U/L (ref 0–50)
ANION GAP SERPL CALCULATED.3IONS-SCNC: 4 MMOL/L (ref 3–14)
AST SERPL W P-5'-P-CCNC: 23 U/L (ref 0–45)
ATRIAL RATE - MUSE: 55 BPM
BASOPHILS # BLD AUTO: 0 10E3/UL (ref 0–0.2)
BASOPHILS NFR BLD AUTO: 0 %
BILIRUB SERPL-MCNC: 0.4 MG/DL (ref 0.2–1.3)
BUN SERPL-MCNC: 26 MG/DL (ref 7–30)
CALCIUM SERPL-MCNC: 9.3 MG/DL (ref 8.5–10.1)
CHLORIDE BLD-SCNC: 107 MMOL/L (ref 94–109)
CO2 SERPL-SCNC: 28 MMOL/L (ref 20–32)
CREAT SERPL-MCNC: 0.8 MG/DL (ref 0.52–1.04)
DIASTOLIC BLOOD PRESSURE - MUSE: NORMAL MMHG
EOSINOPHIL # BLD AUTO: 0.1 10E3/UL (ref 0–0.7)
EOSINOPHIL NFR BLD AUTO: 2 %
ERYTHROCYTE [DISTWIDTH] IN BLOOD BY AUTOMATED COUNT: 13.1 % (ref 10–15)
GFR SERPL CREATININE-BSD FRML MDRD: 83 ML/MIN/1.73M2
GLUCOSE BLD-MCNC: 87 MG/DL (ref 70–99)
HCT VFR BLD AUTO: 40.6 % (ref 35–47)
HGB BLD-MCNC: 13.2 G/DL (ref 11.7–15.7)
IMM GRANULOCYTES # BLD: 0 10E3/UL
IMM GRANULOCYTES NFR BLD: 0 %
INTERPRETATION ECG - MUSE: NORMAL
LIPASE SERPL-CCNC: 94 U/L (ref 73–393)
LYMPHOCYTES # BLD AUTO: 1.6 10E3/UL (ref 0.8–5.3)
LYMPHOCYTES NFR BLD AUTO: 26 %
MCH RBC QN AUTO: 29.3 PG (ref 26.5–33)
MCHC RBC AUTO-ENTMCNC: 32.5 G/DL (ref 31.5–36.5)
MCV RBC AUTO: 90 FL (ref 78–100)
MONOCYTES # BLD AUTO: 0.5 10E3/UL (ref 0–1.3)
MONOCYTES NFR BLD AUTO: 8 %
NEUTROPHILS # BLD AUTO: 3.8 10E3/UL (ref 1.6–8.3)
NEUTROPHILS NFR BLD AUTO: 64 %
NRBC # BLD AUTO: 0 10E3/UL
NRBC BLD AUTO-RTO: 0 /100
NT-PROBNP SERPL-MCNC: 148 PG/ML (ref 0–900)
P AXIS - MUSE: 58 DEGREES
PLATELET # BLD AUTO: 259 10E3/UL (ref 150–450)
POTASSIUM BLD-SCNC: 4.1 MMOL/L (ref 3.4–5.3)
PR INTERVAL - MUSE: 122 MS
PROT SERPL-MCNC: 6.5 G/DL (ref 6.8–8.8)
QRS DURATION - MUSE: 80 MS
QT - MUSE: 396 MS
QTC - MUSE: 378 MS
R AXIS - MUSE: 90 DEGREES
RBC # BLD AUTO: 4.5 10E6/UL (ref 3.8–5.2)
SODIUM SERPL-SCNC: 139 MMOL/L (ref 133–144)
SYSTOLIC BLOOD PRESSURE - MUSE: NORMAL MMHG
T AXIS - MUSE: 77 DEGREES
TROPONIN I SERPL HS-MCNC: <3 NG/L
VENTRICULAR RATE- MUSE: 55 BPM
WBC # BLD AUTO: 6 10E3/UL (ref 4–11)

## 2022-09-20 PROCEDURE — 83690 ASSAY OF LIPASE: CPT | Performed by: EMERGENCY MEDICINE

## 2022-09-20 PROCEDURE — 71046 X-RAY EXAM CHEST 2 VIEWS: CPT

## 2022-09-20 PROCEDURE — 84484 ASSAY OF TROPONIN QUANT: CPT | Performed by: EMERGENCY MEDICINE

## 2022-09-20 PROCEDURE — 93005 ELECTROCARDIOGRAM TRACING: CPT

## 2022-09-20 PROCEDURE — 85014 HEMATOCRIT: CPT | Performed by: EMERGENCY MEDICINE

## 2022-09-20 PROCEDURE — 99285 EMERGENCY DEPT VISIT HI MDM: CPT | Mod: 25

## 2022-09-20 PROCEDURE — 82435 ASSAY OF BLOOD CHLORIDE: CPT | Performed by: EMERGENCY MEDICINE

## 2022-09-20 PROCEDURE — 83880 ASSAY OF NATRIURETIC PEPTIDE: CPT | Performed by: EMERGENCY MEDICINE

## 2022-09-20 PROCEDURE — 36415 COLL VENOUS BLD VENIPUNCTURE: CPT | Performed by: EMERGENCY MEDICINE

## 2022-09-20 ASSESSMENT — ENCOUNTER SYMPTOMS
NUMBNESS: 0
SHORTNESS OF BREATH: 1
ABDOMINAL PAIN: 0
DYSURIA: 0
WEAKNESS: 0
FEVER: 0

## 2022-09-20 ASSESSMENT — ACTIVITIES OF DAILY LIVING (ADL): ADLS_ACUITY_SCORE: 35

## 2022-09-20 NOTE — ED PROVIDER NOTES
"  History     Chief Complaint:  Chest Pain       HPI   Janet Barr is a 62 year old female who presents with left-sided chest discomfort and \"breathing heaviness.\"  She states both symptoms have been present for 2 to 3 months and are unchanged today.  No apparent exacerbating or remitting features.  No other new symptoms today.  She states that her home was recently treated for mold and she wonders if this is part of the etiology of her symptoms.    ROS:  Review of Systems   Constitutional: Negative for fever.   HENT: Negative for hearing loss.    Eyes: Negative for visual disturbance.   Respiratory: Positive for shortness of breath.    Cardiovascular: Positive for chest pain.   Gastrointestinal: Negative for abdominal pain.   Genitourinary: Negative for dysuria.   Skin: Negative for rash.   Neurological: Negative for weakness and numbness.   All other systems reviewed and are negative.         Allergies:  Celecoxib  Codeine     Medications:    No current outpatient medications on file.      Past Medical History:    Past Medical History:   Diagnosis Date     Allergic rhinitis      Anxiety      ASCUS on Pap smear 04/28/2011     Aseptic necrosis (H)      Cervical high risk HPV (human papillomavirus) test positive 03/11/2022     Helicobacter pylori (H. pylori)      History of colonoscopy 2012     Low back pain      Personal history of tobacco use, presenting hazards to health dced approx 1998     Primary localized osteoarthrosis, pelvic region and thigh      Skin cancer      Unspecified functional disorder of stomach        Past Surgical History:    Past Surgical History:   Procedure Laterality Date     COLONOSCOPY  12/10/2012    Procedure: COLONOSCOPY;  colonoscopy;  Surgeon: Alonzo Milton MD;  Location:  GI     [facility] NONSPECIFIC PROCEDURE  x 2    Right Hip     [facility] NONSPECIFIC PROCEDURE  1969    adenoid removed     [facility] NONSPECIFIC PROCEDURE      hemorrhoid op     [facility] NONSPECIFIC PROCEDURE  2007    " "endometrial bx     [facility] NONSPECIFIC PROCEDURE  2009    d & c, fibroid resection, endometrial ablation     [facility] NONSPECIFIC PROCEDURE  2009    Right total hip arthroplasty.        Family History:    family history includes Diabetes in her father and mother; Lung Cancer in her sister.    Social History:   reports that she quit smoking about 23 years ago. Her smoking use included cigarettes. She has a 3.75 pack-year smoking history. She has never used smokeless tobacco. She reports that she does not drink alcohol and does not use drugs.  PCP: Alexy Rojo     Physical Exam     Patient Vitals for the past 24 hrs:   BP Temp Temp src Pulse Resp SpO2 Height Weight   09/20/22 1326 102/49 98.5  F (36.9  C) Temporal 57 14 99 % 1.651 m (5' 5\") 61.2 kg (135 lb)        Physical Exam  General: Laying on the ED stretcher, no distress  HEENT: Normocephalic, atraumatic  Cardiac: Radial pulses 2+, regular rate and rhythm  Pulm: Breathing comfortably, no accessory muscle usage, no conversational dyspnea, lungs clear  GI: Abdomen soft, nontender  MSK: No deformities, no calf tenderness, no asymmetric calf swelling, no pitting edema BLE  Skin: Warm and dry  Neuro: Moves all extremities x4  Psych: Normal mood and affect     Emergency Department Course   ECG:  Sinus bradycardia rate of 55 bpm.  Normal intervals, normal axis, no acute ST-T changes.  When compared to EKG dated 10/22/2009 no significant change.    Imaging:  XR Chest 2 Views   Final Result   IMPRESSION: No acute cardiopulmonary disease.      LINDA LUKE MD            SYSTEM ID:  [id number]         Report per radiology    Laboratory:  Labs Ordered and Resulted from Time of ED Arrival to Time of ED Departure   COMPREHENSIVE METABOLIC PANEL - Abnormal       Result Value    Sodium 139      Potassium 4.1      Chloride 107      Carbon Dioxide (CO2) 28      Anion Gap 4      Urea Nitrogen 26      Creatinine 0.80      Calcium 9.3      Glucose 87      Alkaline Phosphatase 75   "    AST 23      ALT 29      Protein Total 6.5 (*)     Albumin 3.5      Bilirubin Total 0.4      GFR Estimate 83     LIPASE - Normal    Lipase 94     TROPONIN I - Normal    Troponin I High Sensitivity <3     NT PROBNP INPATIENT - Normal    N terminal Pro BNP Inpatient 148     CBC WITH PLATELETS AND DIFFERENTIAL    WBC Count 6.0      RBC Count 4.50      Hemoglobin 13.2      Hematocrit 40.6      MCV 90      MCH 29.3      MCHC 32.5      RDW 13.1      Platelet Count 259      % Neutrophils 64      % Lymphocytes 26      % Monocytes 8      % Eosinophils 2      % Basophils 0      % Immature Granulocytes 0      NRBCs per 100 WBC 0      Absolute Neutrophils 3.8      Absolute Lymphocytes 1.6      Absolute Monocytes 0.5      Absolute Eosinophils 0.1      Absolute Basophils 0.0      Absolute Immature Granulocytes 0.0      Absolute NRBCs 0.0      Emergency Department Course:             Reviewed:  I reviewed nursing notes, vitals and past medical history    Assessments:   I obtained history and examined the patient as noted above.    I rechecked the patient and explained findings.     Disposition:  The patient was discharged to home.     Impression & Plan    CMS Diagnoses: None          Medical Decision Makin-year-old female presents with chest pain and dyspnea on exertion as above.  She is hemodynamically stable in the ED.  No acute ST-T changes on EKG, no signs of ACS.  Troponins negative x2.  Dimer is negative, I do not suspect a PE.  BNP is not elevated, I do not suspect heart failure exacerbation.  No acute findings on chest x-ray.  Plan at this time is for discharge home with further evaluation as an outpatient via her PCP.    Critical Care time:  was 0 minutes for this patient excluding procedures.    Diagnosis:    ICD-10-CM    1. Chronic chest pain  R07.9     G89.29    2. Chronic dyspnea  R06.09            2022   Rashid Broderick MD King, Colin, MD  22 1522       Rashid Broderick MD  22 1529

## 2022-09-20 NOTE — ED NOTES
Rapid Assessment Note    History:   Janet Barr is a 62 year old female who presents with 3 to 4 months of intermittent chest discomfort.  She relays associated dyspnea with exertion.  She denies any fevers.  There are no further aggravating or alleviating factors.    Exam:   General:  Alert, interactive  Cardiovascular:  Well perfused  Lungs:  No respiratory distress, no accessory muscle use  Neuro:  Moving all 4 extremities  Skin:  Warm, dry  Psych:  Normal affect    Plan of Care:   I evaluated the patient and developed an initial plan of care. I discussed this plan and explained that I, or one of my partners, would be returning to complete the evaluation.       09/20/2022  [employer]    Portions of this medical record were completed by a scribe. UPON MY REVIEW AND AUTHENTICATION BY ELECTRONIC SIGNATURE, this confirms (a) I performed the applicable clinical services, and (b) the record is accurate.      Trigger, Sebas Hinkle MD  09/20/22 5494

## 2022-09-20 NOTE — ED TRIAGE NOTES
Approx 4 months chest discomfort in substernal area.  Worse with exhertion     Triage Assessment     Row Name 09/20/22 4713       Triage Assessment (Adult)    Airway WDL WDL       Respiratory WDL    Respiratory WDL WDL       Skin Circulation/Temperature WDL    Skin Circulation/Temperature WDL WDL       Cardiac WDL    Cardiac WDL WDL       Peripheral/Neurovascular WDL    Peripheral Neurovascular WDL WDL       Cognitive/Neuro/Behavioral WDL    Cognitive/Neuro/Behavioral WDL WDL

## 2022-11-20 ENCOUNTER — HEALTH MAINTENANCE LETTER (OUTPATIENT)
Age: 62
End: 2022-11-20

## 2022-12-06 ENCOUNTER — TELEPHONE (OUTPATIENT)
Dept: FAMILY MEDICINE | Facility: CLINIC | Age: 62
End: 2022-12-06

## 2022-12-06 NOTE — TELEPHONE ENCOUNTER
Reason for call:  Other   Patient called regarding (reason for call): appointment  Additional comments: Pt needs a preop. Pt was just scheduled for an eyelid surgery on 12/20/2022 at [facility]. No openings at primary clinic or surrounding locations. Please contact pt.     Phone number to reach patient:  Cell number on file:    Telephone Information:   Mobile [phone]       Best Time:  Anytime    Can we leave a detailed message on this number?  YES    Travel screening: Not Applicable

## 2022-12-14 ENCOUNTER — OFFICE VISIT (OUTPATIENT)
Dept: FAMILY MEDICINE | Facility: CLINIC | Age: 62
End: 2022-12-14
Payer: COMMERCIAL

## 2022-12-14 VITALS
HEART RATE: 64 BPM | RESPIRATION RATE: 16 BRPM | OXYGEN SATURATION: 98 % | HEIGHT: 65 IN | SYSTOLIC BLOOD PRESSURE: 100 MMHG | DIASTOLIC BLOOD PRESSURE: 63 MMHG | TEMPERATURE: 98 F | WEIGHT: 142.5 LBS | BODY MASS INDEX: 23.74 KG/M2

## 2022-12-14 DIAGNOSIS — Z01.818 PREOP GENERAL PHYSICAL EXAM: Primary | ICD-10-CM

## 2022-12-14 DIAGNOSIS — H02.402 PTOSIS OF LEFT EYELID: ICD-10-CM

## 2022-12-14 PROCEDURE — U0003 INFECTIOUS AGENT DETECTION BY NUCLEIC ACID (DNA OR RNA); SEVERE ACUTE RESPIRATORY SYNDROME CORONAVIRUS 2 (SARS-COV-2) (CORONAVIRUS DISEASE [COVID-19]), AMPLIFIED PROBE TECHNIQUE, MAKING USE OF HIGH THROUGHPUT TECHNOLOGIES AS DESCRIBED BY CMS-2020-01-R: HCPCS | Performed by: NURSE PRACTITIONER

## 2022-12-14 PROCEDURE — 99214 OFFICE O/P EST MOD 30 MIN: CPT | Performed by: NURSE PRACTITIONER

## 2022-12-14 PROCEDURE — U0005 INFEC AGEN DETEC AMPLI PROBE: HCPCS | Performed by: NURSE PRACTITIONER

## 2022-12-14 ASSESSMENT — PAIN SCALES - GENERAL: PAINLEVEL: NO PAIN (0)

## 2022-12-14 NOTE — PATIENT INSTRUCTIONS
For informational purposes only. Not to replace the advice of your health care provider. Copyright   2003,  [employer] [facility]. All rights reserved. Clinically reviewed by Kira Chao MD. [employer] [id number] - REV .  Preparing for Your Surgery  Getting started  A nurse will call you to review your health history and instructions. They will give you an arrival time based on your scheduled surgery time. Please be ready to share:    Your doctor's clinic name and phone number    Your medical, surgical, and anesthesia history    A list of allergies and sensitivities    A list of medicines, including herbal treatments and over-the-counter drugs    Whether the patient has a legal guardian (ask how to send us the papers in advance)  Please tell us if you're pregnant--or if there's any chance you might be pregnant. Some surgeries may injure a fetus (unborn baby), so they require a pregnancy test. Surgeries that are safe for a fetus don't always need a test, and you can choose whether to have one.   If you have a child who's having surgery, please ask for a copy of Preparing for Your Child's Surgery.    Preparing for surgery    Within 10 to 30 days of surgery: Have a pre-op exam (sometimes called an H&P, or History and Physical). This can be done at a clinic or pre-operative center.  ? If you're having a , you may not need this exam. Talk to your care team.    At your pre-op exam, talk to your care team about all medicines you take. If you need to stop any medicines before surgery, ask when to start taking them again.  ? We do this for your safety. Many medicines can make you bleed too much during surgery. Some change how well surgery (anesthesia) drugs work.    Call your insurance company to let them know you're having surgery. (If you don't have insurance, call [phone].)    Call your clinic if there's any change in your health. This includes signs of a cold or flu (sore throat, runny nose,  cough, rash, fever). It also includes a scrape or scratch near the surgery site.    If you have questions on the day of surgery, call your hospital or surgery center.  Eating and drinking guidelines  For your safety: Unless your surgeon tells you otherwise, follow the guidelines below.    Eat and drink as usual until 8 hours before you arrive for surgery. After that, no food or milk.    Drink clear liquids until 2 hours before you arrive. These are liquids you can see through, like water, Gatorade, and Propel Water. They also include plain black coffee and tea (no cream or milk), candy, and breath mints. You can spit out gum when you arrive.    If you drink alcohol: Stop drinking it the night before surgery.    If your care team tells you to take medicine on the morning of surgery, it's okay to take it with a sip of water.  Preventing infection    Shower or bathe the night before and morning of your surgery. Follow the instructions your clinic gave you. (If no instructions, use regular soap.)    Don't shave or clip hair near your surgery site. We'll remove the hair if needed.    Don't smoke or vape the morning of surgery. You may chew nicotine gum up to 2 hours before surgery. A nicotine patch is okay.  ? Note: Some surgeries require you to completely quit smoking and nicotine. Check with your surgeon.    Your care team will make every effort to keep you safe from infection. We will:  ? Clean our hands often with soap and water (or an alcohol-based hand rub).  ? Clean the skin at your surgery site with a special soap that kills germs.  ? Give you a special gown to keep you warm. (Cold raises the risk of infection.)  ? Wear special hair covers, masks, gowns and gloves during surgery.  ? Give antibiotic medicine, if prescribed. Not all surgeries need antibiotics.  What to bring on the day of surgery    Photo ID and insurance card    Copy of your health care directive, if you have one    Glasses and hearing aids (bring  cases)  ? You can't wear contacts during surgery    Inhaler and eye drops, if you use them (tell us about these when you arrive)    CPAP machine or breathing device, if you use them    A few personal items, if spending the night    If you have . . .  ? A pacemaker, ICD (cardiac defibrillator) or other implant: Bring the ID card.  ? An implanted stimulator: Bring the remote control.  ? A legal guardian: Bring a copy of the certified (court-stamped) guardianship papers.  Please remove any jewelry, including body piercings. Leave jewelry and other valuables at home.  If you're going home the day of surgery    You must have a responsible adult drive you home. They should stay with you overnight as well.    If you don't have someone to stay with you, and you aren't safe to go home alone, we may keep you overnight. Insurance often won't pay for this.  After surgery  If it's hard to control your pain or you need more pain medicine, please call your surgeon's office.  Questions?   If you have any questions for your care team, list them here: _________________________________________________________________________________________________________________________________________________________________________ ____________________________________ ____________________________________ ____________________________________

## 2022-12-14 NOTE — PROGRESS NOTES
[address], [address]  [facility] [address]  Phone: [phone]  Primary Provider: Alexy Rojo  Pre-op Performing Provider: REMINGTON PERALES      PREOPERATIVE EVALUATION:  Today's date: 12/14/2022    Janet Barr is a 62 year old female who presents for a preoperative evaluation.    Surgical Information:  Surgery/Procedure: Left eye lift  Surgery Location: [facility]  Surgeon: Dr. Milner  Surgery Date: 12-    Time of Surgery: 12:30pm  Where patient plans to recover: At home with family  Fax number for surgical facility: [phone]    Type of Anesthesia Anticipated: to be determined    Assessment & Plan     The proposed surgical procedure is considered LOW risk.    (Z01.818) Preop general physical exam  (primary encounter diagnosis)  Comment: ok for surgery. 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        Subjective     HPI related to upcoming procedure: going for eyelid lift  Has been feeling well. No CP, SOB     Preop Questions 12/14/2022   1. Have you ever had a heart attack or stroke? No   2. Have you ever had surgery on your heart or blood vessels, such as a stent placement, a coronary artery bypass, or surgery on an artery in your head, neck, heart, or legs? No   3. Do you have chest pain with activity? No   4. Do you have a history of  heart failure? No   5. Do you currently have a cold, bronchitis or symptoms of other infection? No    6. Do you have a cough, shortness of breath, or wheezing? No   7. Do you or anyone in your family have previous history of blood clots? No   8. Do you or does anyone in your family have a serious bleeding problem such as prolonged bleeding following surgeries or cuts? No   9. Have you ever had problems with anemia or been told to take iron pills? No   10. Have you had any abnormal blood loss such as black, tarry or bloody stools, or abnormal vaginal bleeding? No   11. Have you ever had a blood transfusion? No   12. Are you willing to have a blood transfusion if it is medically needed before, during, or after your surgery? NO -    13. Have you or any of your relatives ever had problems with anesthesia? No   14. Do you have sleep apnea, excessive snoring or daytime drowsiness? No   15. Do you have any artifical heart valves or other implanted medical devices like a pacemaker, defibrillator, or continuous glucose monitor? No   16. Do you have artificial joints? YES -    17. Are you allergic to latex? No       Health Care Directive:  Patient does not have a Health Care Directive or Living Will:     Preoperative Review of :   reviewed - no record of controlled substances prescribed.      Status of Chronic Conditions:  See problem list for active medical problems.  Problems all longstanding and stable, except as noted/documented.  See ROS for pertinent symptoms related to these conditions.      Review of Systems  Constitutional, neuro, ENT, endocrine, pulmonary, cardiac, gastrointestinal, genitourinary, musculoskeletal, integument and psychiatric systems are negative, except as otherwise noted.    Patient Active Problem List    Diagnosis Date Noted     Bilateral shoulder pain 02/27/2019     Priority: Medium     Low back pain      Priority: Medium     lbp for 20 years, seen 2014 by Dr. Walters       Functional disorder of stomach      Priority: Medium     peptic ulcer  Problem list name updated by automated process.  Provider to review       Anxiety      Priority: Medium     Skin cancer      Priority: Medium     Chronic rhinitis 04/28/2011     Priority: Medium     Cervical high risk HPV (human papillomavirus) test positive 04/28/2011     Priority: Medium     04/28/11: ASCUS pap HPV-neg  06/23/14: NIL pap, Neg HR HPV   06/29/15: NIL pap, Neg HR HPV  7/15/16 NIL Pap, Neg HPV  08/15/17: NIL pap, Neg HR HPV.    2018, 2019 NIL Pap, Neg HPV  1/12/2021 NIL Pap  3/11/22 NIL Pap, + HR HPV 16 (neg 18 & other). [place] due by 6/11/22  3/21/2022 Pt notified by phone.   4/7/22 [place] visually normal; no biopsies taken. Plan cotest in 1 year due 3/11/2023.        Past Medical History:   Diagnosis Date     Allergic rhinitis      Anxiety      ASCUS on Pap smear 04/28/2011    HPV-neg     Aseptic necrosis (H)      Cervical high risk HPV (human papillomavirus) test positive 03/11/2022     Helicobacter pylori (H. pylori)      History of colonoscopy 2012    nl     Low back pain     lbp for 20 years, seen 2014 by Dr. Walters     Personal history of tobacco use, presenting hazards to health dced approx 1998     Primary localized osteoarthrosis, pelvic region and thigh      Skin cancer      Unspecified functional disorder of stomach     peptic ulcer     Past Surgical History:   Procedure Laterality Date     COLONOSCOPY  12/10/2012    Procedure: COLONOSCOPY;  colonoscopy;  Surgeon: Alonzo Milton MD;  Location:  GI     [facility] NONSPECIFIC PROCEDURE  x 2    Right Hip     [facility] NONSPECIFIC PROCEDURE  1969    adenoid removed     [facility] NONSPECIFIC PROCEDURE      hemorrhoid op     [facility] NONSPECIFIC PROCEDURE  2007    endometrial bx     [facility] NONSPECIFIC PROCEDURE  2009    d & c, fibroid resection, endometrial ablation     [facility] NONSPECIFIC PROCEDURE  2009    Right total hip arthroplasty.     No current outpatient medications on file.       Allergies   Allergen Reactions     Celecoxib      gi     Codeine      headache         Social History     Tobacco Use     Smoking  "status: Former     Packs/day: 0.25     Years: 15.00     Pack years: 3.75     Types: Cigarettes     Quit date: 1999     Years since quittin.9     Smokeless tobacco: Never   Substance Use Topics     Alcohol use: No     Family History   Problem Relation Age of Onset     Diabetes Mother         b: 1931: Type 2     Diabetes Father         b: 7: Type 2     Lung Cancer Sister      History   Drug Use No         Objective     /63 (BP Location: Right arm, Patient Position: Sitting, Cuff Size: Adult Regular)   Pulse 64   Temp 98  F (36.7  C) (Tympanic)   Resp 16   Ht 1.651 m (5' 5\")   Wt 64.6 kg (142 lb 8 oz)   SpO2 98%   BMI 23.71 kg/m      Physical Exam    GENERAL APPEARANCE: healthy, alert and no distress     EYES: EOMI,     RESP: lungs clear to auscultation - no rales, rhonchi or wheezes     CV: regular rates and rhythm, normal S1 S2, no S3 or S4 and no murmur, click or rub     MS: extremities normal- no gross deformities noted, no evidence of inflammation in joints, FROM in all extremities.     SKIN: no suspicious lesions or rashes     NEURO: Normal strength and tone, sensory exam grossly normal, mentation intact and speech normal     PSYCH: mentation appears normal. and affect normal/bright    Recent Labs   Lab Test 22  1347 22  1032   HGB 13.2 14.7    269    140   POTASSIUM 4.1 4.6   CR 0.80 0.84        Diagnostics:  No labs were ordered during this visit.   No EKG this visit, completed in the last 90 days.    Revised Cardiac Risk Index (RCRI):  The patient has the following serious cardiovascular risks for perioperative complications:   - No serious cardiac risks = 0 points     RCRI Interpretation: 0 points: Class I (very low risk - 0.4% complication rate)           Signed Electronically by: PORSPER Deutsch CNP  Copy of this evaluation report is provided to requesting physician.      "

## 2022-12-15 LAB — SARS-COV-2 RNA RESP QL NAA+PROBE: NEGATIVE

## 2023-02-17 ENCOUNTER — E-VISIT (OUTPATIENT)
Dept: FAMILY MEDICINE | Facility: CLINIC | Age: 63
End: 2023-02-17
Payer: COMMERCIAL

## 2023-02-17 ENCOUNTER — NURSE TRIAGE (OUTPATIENT)
Dept: FAMILY MEDICINE | Facility: CLINIC | Age: 63
End: 2023-02-17

## 2023-02-17 DIAGNOSIS — J06.9 VIRAL URI: Primary | ICD-10-CM

## 2023-02-17 PROCEDURE — 99421 OL DIG E/M SVC 5-10 MIN: CPT | Performed by: INTERNAL MEDICINE

## 2023-02-17 NOTE — TELEPHONE ENCOUNTER
Pt reports she has a sore throat,nasal congestion, headache, green discharge and hoarseness for a week. Denies fever, chest pain or breathing problems. Has tested for COVID-19 and results is negative. States she went to urgent care today near her and was informed there will be a 2.5 hour wait. She is unable to come to  at [facility]. No virtual appts available. She agreed to start an e-visit today.     Please review e-visit and advice.     Reason for Disposition    SEVERE sinus pain    Additional Information    Negative: Sounds like a life-threatening emergency to the triager    Negative: Difficulty breathing, and not from stuffy nose (e.g., not relieved by cleaning out the nose)    Negative: SEVERE headache and has fever    Negative: Patient sounds very sick or weak to the triager    Protocols used: SINUS PAIN OR CONGESTION-A-OH

## 2023-02-18 NOTE — PATIENT INSTRUCTIONS
The symptoms you describe suggest a viral cause, which is much more common than a bacterial cause. Antibiotics will treat bacterial infections, but have no effect on viral infections. If possible, especially if improving, start with symptom care for the first 7-10 days, then consider seeking further treatment or taking an antibiotic. Bacterial infections generally are more severe, including symptoms such as pus, fever over 101degrees F, or rapidly worsening.  You may want to try a nasal lavage (also known as nasal irrigation). You can find over-the-counter products, such as Neti-Pot, at retail locations or make your own at home. Instructions for homemade nasal lavage and more information on the process are available online at http://www.aafp.org/afp/2009/1115/p1121.html.      Viral Upper Respiratory Illness (Adult)    You have a viral upper respiratory illness (URI), which is another term for the common cold. This illness is contagious during the first few days. It is spread through the air by coughing and sneezing. It may also be spread by direct contact (touching the sick person and then touching your own eyes, nose, or mouth). Frequent handwashing will decrease risk of spread. Most viral illnesses go away within 7 to 10 days with rest and simple home remedies. Sometimes the illness may last for several weeks. Antibiotics will not kill a virus, and they are generally not prescribed for this condition.  Home care  If symptoms are severe, rest at home for the first 2 to 3 days. When you resume activity, don't let yourself get too tired.  Don't smoke. If you need help stopping, talk with your healthcare provider.  Avoid being exposed to cigarette smoke (yours or others ).  You may use acetaminophen or ibuprofen to control pain and fever, unless another medicine was prescribed. If you have chronic liver or kidney disease, have ever had a stomach ulcer or gastrointestinal bleeding, or are taking blood-thinning  medicines, talk with your healthcare provider before using these medicines. Aspirin should never be given to anyone under 18 years of age who is ill with a viral infection or fever. It may cause severe liver or brain damage.  Your appetite may be poor, so a light diet is fine. Stay well hydrated by drinking 6 to 8 glasses of fluids per day (water, soft drinks, juices, tea, or soup). Extra fluids will help loosen secretions in the nose and lungs.  Over-the-counter cold medicines will not shorten the length of time you re sick, but they may be helpful for the following symptoms: cough, sore throat, and nasal and sinus congestion. If you take prescription medicines, ask your healthcare provider or pharmacist which over-the-counter medicines are safe to use. (Note: Don't use decongestants if you have high blood pressure.)  Follow-up care  Follow up with your healthcare provider, or as advised.  When to seek medical advice  Call your healthcare provider right away if any of these occur:  Cough with lots of colored sputum (mucus)  Severe headache; face, neck, or ear pain  Difficulty swallowing due to throat pain  Fever of 100.4 F (38 C) or higher, or as directed by your healthcare provider  Call 911  Call 911 if any of these occur:  Chest pain, shortness of breath, wheezing, or difficulty breathing  Coughing up blood  Very severe pain with swallowing, especially if it goes along with a muffled voice   Nura last reviewed this educational content on 6/1/2018 2000-2021 [employer]. All rights reserved. This information is not intended as a substitute for professional medical care. Always follow your healthcare professional's instructions.

## 2023-04-14 ENCOUNTER — HOSPITAL ENCOUNTER (OUTPATIENT)
Dept: MAMMOGRAPHY | Facility: CLINIC | Age: 63
Discharge: HOME | End: 2023-04-14
Attending: INTERNAL MEDICINE | Admitting: INTERNAL MEDICINE
Payer: COMMERCIAL

## 2023-04-14 DIAGNOSIS — Z12.31 VISIT FOR SCREENING MAMMOGRAM: ICD-10-CM

## 2023-04-14 PROCEDURE — 77067 SCR MAMMO BI INCL CAD: CPT

## 2023-04-15 ENCOUNTER — HEALTH MAINTENANCE LETTER (OUTPATIENT)
Age: 63
End: 2023-04-15

## 2023-09-05 ENCOUNTER — TELEPHONE (OUTPATIENT)
Dept: FAMILY MEDICINE | Facility: CLINIC | Age: 63
End: 2023-09-05
Payer: COMMERCIAL

## 2023-09-05 NOTE — TELEPHONE ENCOUNTER
Patient calling stating she was out of town and had a fall. States she has stitches in her face and needs them to come out on Friday. Scheduled with [facility] r/t location in facial area.    Appointments in Next Year      Sep 08, 2023  1:30 PM  (Arrive by 1:10 PM)  Provider Visit with Travis Nguyen PA-C  [facility] ([facility] ) [phone]     Charlotte Dykes RN on 9/5/2023 at 4:04 PM

## 2023-09-08 ENCOUNTER — ANCILLARY PROCEDURE (OUTPATIENT)
Dept: CT IMAGING | Facility: CLINIC | Age: 63
End: 2023-09-08
Attending: PREVENTIVE MEDICINE
Payer: COMMERCIAL

## 2023-09-08 ENCOUNTER — OFFICE VISIT (OUTPATIENT)
Dept: FAMILY MEDICINE | Facility: CLINIC | Age: 63
End: 2023-09-08
Payer: COMMERCIAL

## 2023-09-08 ENCOUNTER — OFFICE VISIT (OUTPATIENT)
Dept: PEDIATRICS | Facility: CLINIC | Age: 63
End: 2023-09-08
Payer: COMMERCIAL

## 2023-09-08 VITALS
RESPIRATION RATE: 18 BRPM | BODY MASS INDEX: 23.16 KG/M2 | HEART RATE: 59 BPM | HEIGHT: 65 IN | DIASTOLIC BLOOD PRESSURE: 46 MMHG | WEIGHT: 139 LBS | OXYGEN SATURATION: 100 % | SYSTOLIC BLOOD PRESSURE: 107 MMHG | TEMPERATURE: 98.4 F

## 2023-09-08 VITALS
HEART RATE: 59 BPM | RESPIRATION RATE: 18 BRPM | DIASTOLIC BLOOD PRESSURE: 46 MMHG | HEIGHT: 65 IN | WEIGHT: 139 LBS | OXYGEN SATURATION: 100 % | TEMPERATURE: 98.4 F | BODY MASS INDEX: 23.16 KG/M2 | SYSTOLIC BLOOD PRESSURE: 107 MMHG

## 2023-09-08 DIAGNOSIS — S09.90XA CLOSED HEAD INJURY, INITIAL ENCOUNTER: ICD-10-CM

## 2023-09-08 DIAGNOSIS — R51.9 FACIAL PAIN: ICD-10-CM
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
## 2023-09-08 DIAGNOSIS — R51.9 NONINTRACTABLE HEADACHE, UNSPECIFIED CHRONICITY PATTERN, UNSPECIFIED HEADACHE TYPE: ICD-10-CM

## 2023-09-08 DIAGNOSIS — M54.2 NECK PAIN: ICD-10-CM

## 2023-09-08 DIAGNOSIS — S09.90XD INJURY OF HEAD, SUBSEQUENT ENCOUNTER: ICD-10-CM

## 2023-09-08 DIAGNOSIS — S01.81XA FACIAL LACERATION, INITIAL ENCOUNTER: ICD-10-CM

## 2023-09-08 DIAGNOSIS — S09.90XA CLOSED HEAD INJURY, INITIAL ENCOUNTER: Primary | ICD-10-CM

## 2023-09-08 DIAGNOSIS — S06.0X1A CONCUSSION WITH LOSS OF CONSCIOUSNESS OF 30 MINUTES OR LESS, INITIAL ENCOUNTER: ICD-10-CM

## 2023-09-08 PROCEDURE — 72125 CT NECK SPINE W/O DYE: CPT

## 2023-09-08 PROCEDURE — 99214 OFFICE O/P EST MOD 30 MIN: CPT | Performed by: PREVENTIVE MEDICINE

## 2023-09-08 PROCEDURE — 99207 REFERRAL TO ACUTE AND DIAGNOSTIC SERVICES: CPT | Performed by: PHYSICIAN ASSISTANT

## 2023-09-08 PROCEDURE — 99417 PROLNG OP E/M EACH 15 MIN: CPT | Performed by: PREVENTIVE MEDICINE

## 2023-09-08 PROCEDURE — 70450 CT HEAD/BRAIN W/O DYE: CPT

## 2023-09-08 PROCEDURE — 70486 CT MAXILLOFACIAL W/O DYE: CPT

## 2023-09-08 RX ORDER — CEPHALEXIN 500 MG/1
500 CAPSULE ORAL 4 TIMES DAILY
Qty: 20 CAPSULE | Refills: 0 | Status: SHIPPED | OUTPATIENT
Start: 2023-09-08 | End: 2023-09-13

## 2023-09-08 ASSESSMENT — PAIN SCALES - GENERAL: PAINLEVEL: MODERATE PAIN (4)

## 2023-09-08 NOTE — PROGRESS NOTES
Assessment & Plan     Fall, subsequent encounter  Injury of head, subsequent encounter    Given loss of consciousness with unspecified time down with ongoing headache, neck pain, and facial trauma recommended further evaluation with head/neck imaging.  Discussed with ADS team who will continue the evaluation of patient.  She is comfortable with this plan.    - Referral to Acute and Diagnostic Services (Day of diagnostic / First order acute)    30 minutes spent by me on the date of the encounter doing chart review, patient visit, documentation, and discussion with other provider(s)       Return in about 1 month (around 10/8/2023) for follow-up per plan.    The likelihood of other entities in the differential is insufficient to justify any further testing for them at this time. This was explained to the patient. The patient was advised that persistent or worsening symptoms would require further evaluation. Patient advised to call the office and if unable to reach to go to the emergency room if they develop any new or worsening symptoms. Expressed understanding and agreement with above stated plan.     Travis Nguyen PA-C  [facility]    Sahara Barr is a 63 year old female presenting for the following health issues:  Patient presents with:  Suture Removal    Here today for emergency department follow-up.  [place] suffering a mechanical loss of consciousness/fall.  Stepping out of the bathroom at a restaurant when she slipped on a stoop striking her head on a stone floor/railing.  States she woke up on the floor -unsure how long she was unconscious.  Evaluated the emergency department where she received approximately 11 sutures across her face.  Declined head CT at that time however it was recommended per patient.  Unable to see records from ER.  Notes headaches, dizziness, and soreness in her neck.  Not on blood  "thinners.    Denies any preceding symptoms prior to her fall and continues to deny chest pain, shortness of breath, abdominal pain, nausea, or vomiting.  Appetite stable.  No recurrent falls since discharge.  No concerns of weakness, numbness.    Review of Systems   Constitutional, HEENT, cardiovascular, pulmonary, GI, , musculoskeletal, neuro, skin, endocrine and psych systems are negative, except as otherwise noted.      Objective    /46 (BP Location: Left arm, Patient Position: Chair, Cuff Size: Adult Large)   Pulse 59   Temp 98.4  F (36.9  C) (Temporal)   Resp 18   Ht 1.651 m (5' 5\")   Wt 63 kg (139 lb)   SpO2 100%   Breastfeeding No   BMI 23.13 kg/m    5' 5\"  139 lbs 0 oz    Physical Exam   GENERAL: healthy, alert and no distress  EYES: Eyes grossly normal to inspection, PERRL and conjunctivae and sclerae normal  NECK: no adenopathy, no asymmetry, masses, or scars and thyroid normal to palpation.  Full range of motion.  No obvious deformity or step-offs noted in the cervical neck region.   RESP: lungs clear to auscultation - no rales, rhonchi or wheezes  CV: regular rate and rhythm, normal S1 S2, no S3 or S4, no murmur, click or rub, no peripheral edema  MS: no gross musculoskeletal defects noted, no edema  SKIN: Sutures intact across her forehead, maxillary region, and her left-sided lip.  Appear well-healing.  No signs of infection.  Some minor facial bruising otherwise no suspicious lesions or rashes  NEURO: Normal strength and tone, mentation intact and speech normal.  Cranial nerves II through XII grossly intact.  Strength intact in bilateral upper and lower extremities.  PSYCH: mentation appears normal, affect normal/bright      "

## 2023-09-08 NOTE — PROGRESS NOTES
Assessment & Plan     (S09.90XA) Closed head injury, initial encounter  (primary encounter diagnosis)  Plan: CT Head w/o Contrast    (S06.0X1A) Concussion with loss of consciousness of 30 minutes or less, initial encounter  Plan: Concussion  Referral    (S01.81XA) Facial laceration, initial encounter  Plan: cephALEXin (KEFLEX) 500 MG capsule    Ct head, c spine, face reassuring  Symptoms of headache consistent with concussion  Removed sutures from left face and left buccal mucosa without problem  Prescribed keflex for 5 days for some redness around left face laceration.  Follow up for recheck in 5 days or sooner as needed  Referred for concussion symptoms.  For now, do basics - rest.  Avoid re injury.        99 minutes spent by me on the date of the encounter doing chart review, history and exam, documentation and further activities per the note       See Patient Instructions    No follow-ups on file.    Alon Arias MD  [facility] SURYA Gonzales is a 63 year old, presenting for the following health issues:  No chief complaint on file.      HPI     Headache  Onset/Duration: 09/02/23  Description:   Location: predominantly in the back of the head   Character: dull pain  Wake with headaches:  no   Able to do daily activities when headache present:  YES  Intensity: mild  Progression of Symptoms:  same  Accompanying Signs & Symptoms:  Stiff neck: YES  Neck or upper back pain: YES           Sinus or URI symptoms: no  Fever: no  Nausea or vomiting: no  Dizziness: YES  Numbness/tingling: YES- some facial numbness around area with sutures  Weakness: no   Visual changes: no  History:   Head trauma: YES  Family history of migraines: no  Daily pain medication use: no           Previous tests for headaches: no           Neurologist evaluations: no  Precipitating or alleviating factors:   Does light make it worse: no  Does sound make it worse: no  Therapies  Tried and outcome: Ibuprofen    64 yo female who fell while going down a couple stairs while traveling in [place] 6 days ago.  Hit her head on a wall and was bleeding.  Had left face lacerations repaired.  Here for follow up and suture removal.  Has headache worse over past couple days.  Did also lose consciousness she believes after she fell as she does not remember entire event.  Her  found her and brought her to the hospital.    Review of Systems   Constitutional, HEENT, cardiovascular, pulmonary, GI, , musculoskeletal, neuro, skin, endocrine and psych systems are negative, except as otherwise noted.      Objective    There were no vitals taken for this visit.  There is no height or weight on file to calculate BMI.  Physical Exam   GENERAL: healthy, alert and no distress  EYES: Eyes grossly normal to inspection, PERRL and conjunctivae and sclerae normal  HENT: ear canals and TM's normal, nose and mouth without ulcers or lesions except 2 sutures present for removal from left buccal mucosa.  NECK: no adenopathy, no asymmetry, masses, or scars and thyroid normal to palpation  RESP: lungs clear to auscultation - no rales, rhonchi or wheezes  CV: regular rate and rhythm, normal S1 S2, no S3 or S4, no murmur, click or rub, no peripheral edema and peripheral pulses strong  ABDOMEN: soft, nontender, no hepatosplenomegaly, no masses and bowel sounds normal  MS: no gross musculoskeletal defects noted, no edema  SKIN: no suspicious lesions or rashes except healing lacerations ready for suture removal on left face (left nose extending to left forehead, lateral to left nose, above left lip), one near left lip has slight redness but minimal warmth and no fluctuance  NEURO: Normal strength and tone, mentation intact and speech normal  PSYCH: mentation appears normal, affect normal/bright        Results for orders placed or performed in visit on 09/08/23   CT Cervical Spine w/o Contrast     Status: None    Narrative     EXAM: CT HEAD W/O CONTRAST, CT CERVICAL SPINE W/O CONTRAST, CT FACIAL BONES WITHOUT CONTRAST  LOCATION: [facility]  DATE: 9/8/2023    INDICATION: headache 6 days after fall and head injury  COMPARISON: None.  TECHNIQUE:   1) Routine CT Head without IV contrast. Multiplanar reformats. Dose reduction techniques were used.  2) Routine CT Facial Bones without IV contrast. Multiplanar reformats. Dose reduction techniques were used.  3) Routine CT Cervical Spine without IV contrast. Multiplanar reformats. Dose reduction techniques were used.    FINDINGS:  HEAD CT:   INTRACRANIAL CONTENTS: No intracranial hemorrhage, extraaxial collection, or mass effect.  No CT evidence of acute infarct. Normal parenchymal density for age. The ventricles and sulci are normal for age.     OSSEOUS STRUCTURES/SOFT TISSUES: No significant abnormality.     FACIAL BONE CT:  OSSEOUS STRUCTURES/SOFT TISSUES: No localized soft tissue swelling/inflammation. No facial bone fracture or malalignment. No evidence for dental trauma or periapical abscess.    ORBITAL CONTENTS: No acute abnormality.    SINUSES: No paranasal sinus mucosal disease.    CERVICAL SPINE CT:   VERTEBRA: Normal vertebral body heights and alignment. No fracture or posttraumatic subluxation.     CANAL/FORAMINA: No canal or neural foraminal stenosis.    PARASPINAL: No extraspinal abnormality. Visualized lung fields are clear.      Impression    IMPRESSION:  HEAD CT:  1.  No acute intracranial process.    FACIAL BONE CT:  1.  No facial bone or mandibular fracture.    CERVICAL SPINE CT:  1.  No fracture or posttraumatic subluxation.  2.  No high-grade spinal canal or neural foraminal stenosis.   Results for orders placed or performed in visit on 09/08/23   CT Head w/o Contrast     Status: None    Narrative    EXAM: CT HEAD W/O CONTRAST, CT CERVICAL SPINE W/O CONTRAST, CT FACIAL BONES WITHOUT CONTRAST  LOCATION: [facility]  DATE:  9/8/2023    INDICATION: headache 6 days after fall and head injury  COMPARISON: None.  TECHNIQUE:   1) Routine CT Head without IV contrast. Multiplanar reformats. Dose reduction techniques were used.  2) Routine CT Facial Bones without IV contrast. Multiplanar reformats. Dose reduction techniques were used.  3) Routine CT Cervical Spine without IV contrast. Multiplanar reformats. Dose reduction techniques were used.    FINDINGS:  HEAD CT:   INTRACRANIAL CONTENTS: No intracranial hemorrhage, extraaxial collection, or mass effect.  No CT evidence of acute infarct. Normal parenchymal density for age. The ventricles and sulci are normal for age.     OSSEOUS STRUCTURES/SOFT TISSUES: No significant abnormality.     FACIAL BONE CT:  OSSEOUS STRUCTURES/SOFT TISSUES: No localized soft tissue swelling/inflammation. No facial bone fracture or malalignment. No evidence for dental trauma or periapical abscess.    ORBITAL CONTENTS: No acute abnormality.    SINUSES: No paranasal sinus mucosal disease.    CERVICAL SPINE CT:   VERTEBRA: Normal vertebral body heights and alignment. No fracture or posttraumatic subluxation.     CANAL/FORAMINA: No canal or neural foraminal stenosis.    PARASPINAL: No extraspinal abnormality. Visualized lung fields are clear.      Impression    IMPRESSION:  HEAD CT:  1.  No acute intracranial process.    FACIAL BONE CT:  1.  No facial bone or mandibular fracture.    CERVICAL SPINE CT:  1.  No fracture or posttraumatic subluxation.  2.  No high-grade spinal canal or neural foraminal stenosis.   Results for orders placed or performed in visit on 09/08/23   CT Facial Bones without Contrast     Status: None    Narrative    EXAM: CT HEAD W/O CONTRAST, CT CERVICAL SPINE W/O CONTRAST, CT FACIAL BONES WITHOUT CONTRAST  LOCATION: [facility]  DATE: 9/8/2023    INDICATION: headache 6 days after fall and head injury  COMPARISON: None.  TECHNIQUE:   1) Routine CT Head without IV contrast.  Multiplanar reformats. Dose reduction techniques were used.  2) Routine CT Facial Bones without IV contrast. Multiplanar reformats. Dose reduction techniques were used.  3) Routine CT Cervical Spine without IV contrast. Multiplanar reformats. Dose reduction techniques were used.    FINDINGS:  HEAD CT:   INTRACRANIAL CONTENTS: No intracranial hemorrhage, extraaxial collection, or mass effect.  No CT evidence of acute infarct. Normal parenchymal density for age. The ventricles and sulci are normal for age.     OSSEOUS STRUCTURES/SOFT TISSUES: No significant abnormality.     FACIAL BONE CT:  OSSEOUS STRUCTURES/SOFT TISSUES: No localized soft tissue swelling/inflammation. No facial bone fracture or malalignment. No evidence for dental trauma or periapical abscess.    ORBITAL CONTENTS: No acute abnormality.    SINUSES: No paranasal sinus mucosal disease.    CERVICAL SPINE CT:   VERTEBRA: Normal vertebral body heights and alignment. No fracture or posttraumatic subluxation.     CANAL/FORAMINA: No canal or neural foraminal stenosis.    PARASPINAL: No extraspinal abnormality. Visualized lung fields are clear.      Impression    IMPRESSION:  HEAD CT:  1.  No acute intracranial process.    FACIAL BONE CT:  1.  No facial bone or mandibular fracture.    CERVICAL SPINE CT:  1.  No fracture or posttraumatic subluxation.  2.  No high-grade spinal canal or neural foraminal stenosis.

## 2023-09-18 ENCOUNTER — OFFICE VISIT (OUTPATIENT)
Dept: FAMILY MEDICINE | Facility: CLINIC | Age: 63
End: 2023-09-18
Payer: COMMERCIAL

## 2023-09-18 ENCOUNTER — NURSE TRIAGE (OUTPATIENT)
Dept: NURSING | Facility: CLINIC | Age: 63
End: 2023-09-18

## 2023-09-18 VITALS
OXYGEN SATURATION: 100 % | BODY MASS INDEX: 23.57 KG/M2 | SYSTOLIC BLOOD PRESSURE: 110 MMHG | TEMPERATURE: 98.3 F | DIASTOLIC BLOOD PRESSURE: 72 MMHG | HEIGHT: 65 IN | RESPIRATION RATE: 15 BRPM | WEIGHT: 141.5 LBS | HEART RATE: 59 BPM

## 2023-09-18 DIAGNOSIS — Z48.02 VISIT FOR SUTURE REMOVAL: Primary | ICD-10-CM

## 2023-09-18 PROCEDURE — 99212 OFFICE O/P EST SF 10 MIN: CPT | Performed by: NURSE PRACTITIONER

## 2023-09-18 ASSESSMENT — PAIN SCALES - GENERAL: PAINLEVEL: NO PAIN (0)

## 2023-09-18 NOTE — TELEPHONE ENCOUNTER
Nurse Triage SBAR    Is this a 2nd Level Triage? YES, LICENSED PRACTITIONER REVIEW IS REQUIRED    Situation: Patient calling with questions about getting remaining sutures in mouth removed.Can patient wait for annual wellness visit on 9/29 or should she come in sooner?  Protocol advises discussing with PCP and callback by nurse today. Consent: not needed    Background: Patient was seen on 9/8 and had stitches removed from  left facial laceration and left buccal mucosa. Provider was unable to remove all of the stitches in back of upper mucosa due to swelling    On last day of antibiotics    Assessment:   Swelling has decreased and patient can feel sharp stitches in top left side of gums  Red around laceration on face, but healing  No pain   No swelling  No fever  No drainage    Protocol Recommended Disposition:   Discuss with PCP and callback by nurse today    Recommendation: Advised patient to wait for a call back after message is sent to provider. Patient verbalized understanding and agreed with plan.     Routed to provider to review and advise    Does the patient meet one of the following criteria for ADS visit consideration? 16+ years old, with an [facility] PCP     TIP  Providers, please consider if this condition is appropriate for management at one of our Acute and Diagnostic Services sites.     If patient is a good candidate, please use dotphrase <dot>triageresponse and select Refer to ADS to document.     Diane Bryant RN [facility] 9/18/2023 9:50 AM    Reason for Disposition   Patient wants to be seen   Surgical incision symptoms and questions   Caller has NON-URGENT question and triager unable to answer question    Additional Information   Negative: Wound gaping open after sutures (or staples) AND > 48 hours since wound re-opened AND length of opening > 1/2 inch (12 mm)   Negative: Face wound gaping open after sutures (or staples) AND > 48 hours since wound re-opened AND length of opening > 1/4  inch (6 mm)   Negative: Suture or staple removal is overdue   Negative: Suture (or staple) came out early and > 48 hours since sutures placed, and caller wants wound checked   Negative: Bleeding won't stop after 10 minutes of direct pressure (using correct technique)   Negative: Wound gaping open after sutures (or staples) AND < 48 hours since wound re-opened   Negative: Patient sounds very sick or weak to the triager   Negative: Wound looks infected   Negative: New cut and caller wonders if it needs stitches   Negative: Major abdominal surgical wound and visible internal organs   Negative: Sounds like a life-threatening emergency to the triager   Negative: Suture or staple came out early and caller wants wound checked   Negative: INCREASING pain in incision and over 2 days (48 hours) since surgery   Negative: Patient wants to be seen   Negative: Incision on the face gaping open and length of opening > 1/4 inch (6 mm), and over 2 days since wound re-opened   Negative: Incision gaping open and length of opening > 1/2 inch (1 cm) and over 2 days since wound re-opened   Negative: Clear or blood-tinged fluid draining from wound and no fever   Negative: Suture or staple removal is overdue   Negative: Fever > 100.4 F (38.0 C)   Negative: Incision looks infected (spreading redness, pain)   Negative: Red streak runs from the incision and longer than 1 inch (2.5 cm)   Negative: Pus or bad-smelling fluid draining from incision   Negative: Dressing soaked with blood or body fluid (e.g., drainage)   Negative: Raised bruise and size > 2 inches (5 cm) and expanding   Negative: Scant bleeding (e.g., few drops) from incision and after tracheostomy or blood vessel surgery (e.g., carotid endarterectomy, femoral bypass graft, kidney dialysis fistula   Negative: Caller has URGENT question and triager unable to answer question   Negative: SEVERE pain in the incision   Negative: Incision gaping open and < 2 days (48 hours) since wound  re-opened   Negative: Incision gaping open and length of opening > 2 inches (5 cm)   Negative: Patient sounds very sick or weak to the triager   Negative: Sounds like a serious complication to the triager   Negative: Bleeding from incision and won't stop after 10 minutes of direct pressure   Negative: Bleeding (more than a few drops) from incision and after tracheostomy or blood vessel surgery (e.g., carotid endarterectomy, femoral bypass graft, kidney dialysis fistula)   Negative: Bright red, wide-spread, sunburn-like rash   Negative: Major abdominal surgical incision and wound gaping open with visible internal organs   Negative: Sounds like a life-threatening emergency to the triager    Protocols used: Suture or Staple [id number], Post-Op Incision Symptoms and [id number]

## 2023-09-18 NOTE — TELEPHONE ENCOUNTER
"Writer called and spoke with patient and reviewed PCP's recommendation, patient is willing to come to clinic today to be seen.     Per chart review, patient was seen in ADS on 9/8/23 with the following note from Dr. Arias:    \"Removed sutures from left face and left buccal mucosa without problem\"    Patient states the swelling has improved but she can still feel something \"sharp\" and states \"I think there are still some parts of the stitches in there\" - states the swelling was worse when in clinic on 9/8/23 and unsure if they were all removed.     Per protocol, RN's are not able to remove these sutures - appointment with provider is needed.     Scheduled patient for next available appt this afternoon:    9/18/2023 4:30 PM (Arrive by 4:10 PM) Janice Henry APRN CNP [facility]     Routing to PCP and Janice as OLIVIER - please route back to triage if any adjustments to above plan - thank you!     Ariane Lomeli RN  [facility]  "

## 2023-09-18 NOTE — TELEPHONE ENCOUNTER
Message was routed to triage pool - intended for review from PCP.    Routing to PCP to review and advise     Ariane Lomeli RN  [facility]

## 2023-09-18 NOTE — PROGRESS NOTES
"  Assessment & Plan     (Z48.02) Visit for suture removal  (primary encounter diagnosis)  Comment: floating suture in upper inner lip removed. Suture next to nostril removed. Follow-up prn   Plan:       PROSPER Deutsch CNP  [facility] SURYA Gonzales is a 63 year old, presenting for the following health issues:  Suture Removal      HPI     Patient had a fall in [place] and where she got sutures.  She had sutures removed recently here in clinic.  She had a lot of swelling and noticed a remaining suture in her face and buccal mucosa after the swelling went down.  She is here for removal    Review of Systems   Detailed as above         Objective    /72   Pulse 59   Temp 98.3  F (36.8  C) (Tympanic)   Resp 15   Ht 1.651 m (5' 5\")   Wt 64.2 kg (141 lb 8 oz)   SpO2 100%   BMI 23.55 kg/m    There is no height or weight on file to calculate BMI.  Physical Exam  HENT:      Head:      Comments: Floating piece of suture in upper inner lip   Small slightly embedded suture just left of nostril                    "

## 2023-09-29 ENCOUNTER — OFFICE VISIT (OUTPATIENT)
Dept: FAMILY MEDICINE | Facility: CLINIC | Age: 63
End: 2023-09-29
Payer: COMMERCIAL

## 2023-09-29 VITALS
HEART RATE: 52 BPM | SYSTOLIC BLOOD PRESSURE: 107 MMHG | HEIGHT: 65 IN | RESPIRATION RATE: 16 BRPM | DIASTOLIC BLOOD PRESSURE: 68 MMHG | WEIGHT: 141 LBS | OXYGEN SATURATION: 93 % | BODY MASS INDEX: 23.49 KG/M2 | TEMPERATURE: 97.7 F

## 2023-09-29 DIAGNOSIS — Z00.00 ENCOUNTER FOR ANNUAL PHYSICAL EXAM: Primary | ICD-10-CM

## 2023-09-29 DIAGNOSIS — Z23 NEED FOR VACCINATION: ICD-10-CM

## 2023-09-29 DIAGNOSIS — H90.3 BILATERAL SENSORINEURAL HEARING LOSS: ICD-10-CM

## 2023-09-29 DIAGNOSIS — R42 DIZZINESS: ICD-10-CM

## 2023-09-29 DIAGNOSIS — R87.810 CERVICAL HIGH RISK HPV (HUMAN PAPILLOMAVIRUS) TEST POSITIVE: ICD-10-CM

## 2023-09-29 DIAGNOSIS — R12 HEARTBURN: ICD-10-CM

## 2023-09-29 LAB
ALBUMIN SERPL BCG-MCNC: 4.4 G/DL (ref 3.5–5.2)
ALP SERPL-CCNC: 68 U/L (ref 35–104)
ALT SERPL W P-5'-P-CCNC: 18 U/L (ref 0–50)
ANION GAP SERPL CALCULATED.3IONS-SCNC: 9 MMOL/L (ref 7–15)
AST SERPL W P-5'-P-CCNC: 26 U/L (ref 0–45)
BILIRUB SERPL-MCNC: 0.5 MG/DL
BUN SERPL-MCNC: 17.1 MG/DL (ref 8–23)
CALCIUM SERPL-MCNC: 9.7 MG/DL (ref 8.8–10.2)
CHLORIDE SERPL-SCNC: 106 MMOL/L (ref 98–107)
CHOLEST SERPL-MCNC: 249 MG/DL
CREAT SERPL-MCNC: 0.73 MG/DL (ref 0.51–0.95)
DEPRECATED HCO3 PLAS-SCNC: 26 MMOL/L (ref 22–29)
EGFRCR SERPLBLD CKD-EPI 2021: >90 ML/MIN/1.73M2
ERYTHROCYTE [DISTWIDTH] IN BLOOD BY AUTOMATED COUNT: 12.9 % (ref 10–15)
GLUCOSE SERPL-MCNC: 91 MG/DL (ref 70–99)
HCT VFR BLD AUTO: 44.9 % (ref 35–47)
HDLC SERPL-MCNC: 93 MG/DL
HGB BLD-MCNC: 13.9 G/DL (ref 11.7–15.7)
LDLC SERPL CALC-MCNC: 136 MG/DL
MCH RBC QN AUTO: 28.8 PG (ref 26.5–33)
MCHC RBC AUTO-ENTMCNC: 31 G/DL (ref 31.5–36.5)
MCV RBC AUTO: 93 FL (ref 78–100)
NONHDLC SERPL-MCNC: 156 MG/DL
PLATELET # BLD AUTO: 263 10E3/UL (ref 150–450)
POTASSIUM SERPL-SCNC: 4.2 MMOL/L (ref 3.4–5.3)
PROT SERPL-MCNC: 6.7 G/DL (ref 6.4–8.3)
RBC # BLD AUTO: 4.82 10E6/UL (ref 3.8–5.2)
SODIUM SERPL-SCNC: 141 MMOL/L (ref 135–145)
TRIGL SERPL-MCNC: 98 MG/DL
WBC # BLD AUTO: 4.7 10E3/UL (ref 4–11)

## 2023-09-29 PROCEDURE — 80061 LIPID PANEL: CPT | Performed by: INTERNAL MEDICINE

## 2023-09-29 PROCEDURE — 90471 IMMUNIZATION ADMIN: CPT | Performed by: INTERNAL MEDICINE

## 2023-09-29 PROCEDURE — 80053 COMPREHEN METABOLIC PANEL: CPT | Performed by: INTERNAL MEDICINE

## 2023-09-29 PROCEDURE — 99213 OFFICE O/P EST LOW 20 MIN: CPT | Mod: 25 | Performed by: INTERNAL MEDICINE

## 2023-09-29 PROCEDURE — 90714 TD VACC NO PRESV 7 YRS+ IM: CPT | Performed by: INTERNAL MEDICINE

## 2023-09-29 PROCEDURE — 99396 PREV VISIT EST AGE 40-64: CPT | Mod: 25 | Performed by: INTERNAL MEDICINE

## 2023-09-29 PROCEDURE — 85027 COMPLETE CBC AUTOMATED: CPT | Performed by: INTERNAL MEDICINE

## 2023-09-29 PROCEDURE — 87624 HPV HI-RISK TYP POOLED RSLT: CPT | Performed by: INTERNAL MEDICINE

## 2023-09-29 PROCEDURE — 36415 COLL VENOUS BLD VENIPUNCTURE: CPT | Performed by: INTERNAL MEDICINE

## 2023-09-29 PROCEDURE — G0145 SCR C/V CYTO,THINLAYER,RESCR: HCPCS | Performed by: INTERNAL MEDICINE

## 2023-09-29 RX ORDER — FAMOTIDINE 40 MG/1
40 TABLET, FILM COATED ORAL 2 TIMES DAILY PRN
Qty: 50 TABLET | Refills: 3 | Status: SHIPPED | OUTPATIENT
Start: 2023-09-29 | End: 2024-03-06

## 2023-09-29 ASSESSMENT — ENCOUNTER SYMPTOMS
MYALGIAS: 1
JOINT SWELLING: 0
SORE THROAT: 0
CHILLS: 0
DYSURIA: 0
COUGH: 0
HEMATOCHEZIA: 0
PARESTHESIAS: 0
HEMATURIA: 0
EYE PAIN: 1
ARTHRALGIAS: 0
DIZZINESS: 0
BREAST MASS: 0
WEAKNESS: 0
FREQUENCY: 0
NAUSEA: 0
NERVOUS/ANXIOUS: 0
PALPITATIONS: 0
HEARTBURN: 1
DIARRHEA: 0
HEADACHES: 0
FEVER: 0
CONSTIPATION: 1

## 2023-09-29 ASSESSMENT — PAIN SCALES - GENERAL: PAINLEVEL: NO PAIN (0)

## 2023-09-29 NOTE — PATIENT INSTRUCTIONS
Use the famotidine as needed for the heartburn.    Get the bone density test    If your smile is not back to normal soon let me know    Do the colon test which will be mailed to you.    I would recommend getting the flu shot, covid booster and the rsv vaccine at your pharmacy.    Alexy Rojo M.D.

## 2023-09-29 NOTE — PROGRESS NOTES
SUBJECTIVE:   CC: Janet is an 63 year old who presents for preventive health visit.     She had a recent slip and fall which was not syncopal.  She is recovering from that but occasionally notes very slight dizziness and she feels like her left facial region where she smiles has a slight droop.    She has a history of HPV positive.    She has some hearing loss for the last year or more a bit more on the left side.    Occasionally she has heartburn for years.  She wants med for that.  No dysphagia or nausea.    She otherwise is doing well.  She does exercise by walking.      Healthy Habits:     Getting at least 3 servings of Calcium per day:  Yes    Bi-annual eye exam:  Yes    Dental care twice a year:  Yes    Sleep apnea or symptoms of sleep apnea:  None    Diet:  Regular (no restrictions) and Diabetic    Frequency of exercise:  None    Taking medications regularly:  Yes    Medication side effects:  Not applicable    Additional concerns today:  Yes                     History of Present Illness:   This patient is a 63 year old female who presents for a complete physical examination.            Past Medical History:      Past Medical History:   Diagnosis Date    Allergic rhinitis     Anxiety     ASCUS on Pap smear 04/28/2011    HPV-neg    Aseptic necrosis (H)     Cervical high risk HPV (human papillomavirus) test positive 03/11/2022    Helicobacter pylori (H. pylori)     History of colonoscopy 2012    nl    Low back pain     lbp for 20 years, seen 2014 by Dr. Walters    Personal history of tobacco use, presenting hazards to health dced approx 1998    Primary localized osteoarthrosis, pelvic region and thigh     Skin cancer     Unspecified functional disorder of stomach     peptic ulcer             Past Surgical History:      Past Surgical History:   Procedure Laterality Date    COLONOSCOPY  12/10/2012    Procedure: COLONOSCOPY;  colonoscopy;  Surgeon: Alonzo Milton MD;  Location: [facility] NONSPECIFIC  PROCEDURE  x 2    Right Hip    [facility] NONSPECIFIC PROCEDURE  1969    adenoid removed    [facility] NONSPECIFIC PROCEDURE      hemorrhoid op    Z NONSPECIFIC PROCEDURE  2007    endometrial bx    [facility] NONSPECIFIC PROCEDURE  2009    d & c, fibroid resection, endometrial ablation    [facility] NONSPECIFIC PROCEDURE  2009    Right total hip arthroplasty.             Social History:     Social History     Socioeconomic History    Marital status:      Spouse name: Not on file    Number of children: 2    Years of education: Not on file    Highest education level: Not on file   Occupational History    Occupation: cleaning service     Employer: SELF   Tobacco Use    Smoking status: Former     Packs/day: 0.25     Years: 15.00     Pack years: 3.75     Types: Cigarettes     Quit date: 1999     Years since quittin.7    Smokeless tobacco: Never   Substance and Sexual Activity    Alcohol use: No    Drug use: No    Sexual activity: Yes   Other Topics Concern    Parent/sibling w/ CABG, MI or angioplasty before 65F 55M? Not Asked   Social History Narrative    Not on file     Social Determinants of Health     Financial Resource Strain: Low Risk  (2023)    Financial Resource Strain     Within the past 12 months, have you or your family members you live with been unable to get utilities (heat, electricity) when it was really needed?: No   Food Insecurity: Low Risk  (2023)    Food Insecurity     Within the past 12 months, did you worry that your food would run out before you got money to buy more?: No     Within the past 12 months, did the food you bought just not last and you didn t have money to get more?: Patient refused   Transportation Needs: Low Risk  (2023)    Transportation Needs     Within the past 12 months, has lack of transportation kept you from medical appointments, getting your medicines, non-medical meetings or appointments, work, or from getting things that you need?: No   Physical Activity: Not on file  "  Stress: Not on file   Social Connections: Not on file   Interpersonal Safety: Not on file   Housing Stability: Low Risk  (9/29/2023)    Housing Stability     Do you have housing? : Yes     Are you worried about losing your housing?: No             Family History:   Reviewed          Immunizations:     Immunization History   Administered Date(s) Administered    COVID-19 Monovalent 18+ (Moderna) 02/11/2021, 03/10/2021    Flu, Unspecified 11/11/1997    Influenza (IIV3) PF 11/11/1997    Influenza Vaccine 18-64 (Flublok) 09/26/2019, 10/27/2020    Influenza Vaccine >6 months (Alfuria,Fluzone) 09/20/2018    TDAP Vaccine (Adacel) 05/21/2013    Zoster recombinant adjuvanted (SHINGRIX) 01/12/2021, 03/11/2022            Allergies:     Allergies   Allergen Reactions    Celecoxib      gi    Morphine And Related      headache              Medications:   No current outpatient medications on file prior to visit.  No current facility-administered medications on file prior to visit.            Review of Systems:     The 10 point Review of Systems is negative other than noted in the HPI           Physical Exam:   Vitals were reviewed  Blood pressure 107/68, pulse 52, temperature 97.7  F (36.5  C), temperature source Temporal, resp. rate 16, height 1.651 m (5' 5\"), weight 64 kg (141 lb), SpO2 93 %, not currently breastfeeding.    Exam:  Constitutional: healthy appearing, alert and in no distress  Heent: Normocephalic. Head without obvious masses or lesions. PERRLDC, EOMI. Mouth exam within normal limits: tongue, mucous membranes, posterior pharynx all normal, no lesions or abnormalities seen.  Tm's and canals within normal limits bilaterally. Neck supple, no nuchal rigidity or masses. No supraclavicular, or cervical adenopathy. Thyroid symmetric, no masses.  Cardiovascular: Regular rate and rhythm, no murmer, rub or gallops.  JVP not elevated, no edema.  Carotids within normal limits bilaterally, no bruits.  Respiratory: Normal " respiratory effort.  Lungs clear, normal flow, no wheezing or crackles.  Breasts: Normal bilaterally.  No masses or lesions.  Nipples within normal limites.  No axillary lesions or nodes.  My M.A. Was present during this part of the examination.  Gastrointestinal: Normal active bowel sounds.   Soft, not tender, no masses, guarding or rebound.  No hepatosplenomegaly.   Pelvic: External genitalia within normal limits.  No masses or lesions.  Speculum exam unremarkable.  Cervix intact, no lesions. No significant discharge seen. Bimanual exam within normal limits.  No abnormal masses felt and no tenderness.  Recto-vaginal exam within normal limits.  Exam chaperoned by nurse.  Musculoskeletal: extremities normal, no gross deformities noted.  Skin: no suspicious lesions or rashes, scarring is noted on the left face.  It is very subtle but she may have a very faint droop of the left mouth are  Neurologic: Mental status within normal limits.  Speech fluent.  No gross motor abnormalities and gait intact.  Psychiatric: mentation appears normal and affect normal.         Data:   Labs sent        Assessment:   Normal complete physical exam  Dizziness, due to concussion, call if worsens or not gone soon  Hearing loss, to ent  Hpv pos, follow up pap and hpv done today per last gyn note  Heartburn, doubt malig cause  hcm         Plan:   Td shot  Other vaccines at pharm  Pap and hpv  Up to date mammogram  Cologuard  To ent  Call if facial droop not gone soon  Letter with labs  Exercise, diet  Pepcid minal Rojo MD

## 2023-09-29 NOTE — LETTER
September 29, 2023      Janet VY Cortney  [address] [place] [address]        Dear ,    We are writing to inform you of your test results.    It was a pleasure seeing you for your physical examination.  I wanted to get back to you with your test results.  I have enclosed a copy for your review.     I am happy to report that your cbc or complete blood count is normal with no signs of anemia, leukemia or platelet abnormalities. Your chemistry panel shows no signs of diabetes.  Your blood salts, kidney tests, liver tests, and proteins are all fine.     Your total cholesterol is 249 with the normal range being below 200.  Your HDL or good cholesterol is wonderful at 93 with the normal range being above 50.  Your LDL or bad cholesterol is 136 with the normal range being below 130.  Given the excellent HDL the numbers overall are very good.     I am happy to bring you this excellent report.  Please let me know if you have questions.     Resulted Orders   CBC with platelets   Result Value Ref Range    WBC Count 4.7 4.0 - 11.0 10e3/uL    RBC Count 4.82 3.80 - 5.20 10e6/uL    Hemoglobin 13.9 11.7 - 15.7 g/dL    Hematocrit 44.9 35.0 - 47.0 %    MCV 93 78 - 100 fL    MCH 28.8 26.5 - 33.0 pg    MCHC 31.0 (L) 31.5 - 36.5 g/dL    RDW 12.9 10.0 - 15.0 %    Platelet Count 263 150 - 450 10e3/uL   Comprehensive metabolic panel   Result Value Ref Range    Sodium 141 135 - 145 mmol/L      Comment:      Reference intervals for this test were updated on 09/26/2023 to more accurately reflect our healthy population. There may be differences in the flagging of prior results with similar values performed with this method. Interpretation of those prior results can be made in the context of the updated reference intervals.     Potassium 4.2 3.4 - 5.3 mmol/L    Carbon Dioxide (CO2) 26 22 - 29 mmol/L    Anion Gap 9 7 - 15 mmol/L    Urea Nitrogen 17.1 8.0 - 23.0 mg/dL    Creatinine 0.73 0.51 - 0.95 mg/dL    GFR Estimate  >90 >60 mL/min/1.73m2    Calcium 9.7 8.8 - 10.2 mg/dL    Chloride 106 98 - 107 mmol/L    Glucose 91 70 - 99 mg/dL    Alkaline Phosphatase 68 35 - 104 U/L    AST 26 0 - 45 U/L      Comment:      Reference intervals for this test were updated on 6/12/2023 to more accurately reflect our healthy population. There may be differences in the flagging of prior results with similar values performed with this method. Interpretation of those prior results can be made in the context of the updated reference intervals.    ALT 18 0 - 50 U/L      Comment:      Reference intervals for this test were updated on 6/12/2023 to more accurately reflect our healthy population. There may be differences in the flagging of prior results with similar values performed with this method. Interpretation of those prior results can be made in the context of the updated reference intervals.      Protein Total 6.7 6.4 - 8.3 g/dL    Albumin 4.4 3.5 - 5.2 g/dL    Bilirubin Total 0.5 <=1.2 mg/dL   Lipid panel reflex to direct LDL Fasting   Result Value Ref Range    Cholesterol 249 (H) <200 mg/dL    Triglycerides 98 <150 mg/dL    Direct Measure HDL 93 >=50 mg/dL    LDL Cholesterol Calculated 136 (H) <=100 mg/dL    Non HDL Cholesterol 156 (H) <130 mg/dL    Narrative    Cholesterol  Desirable:  <200 mg/dL    Triglycerides  Normal:  Less than 150 mg/dL  Borderline High:  150-199 mg/dL  High:  200-499 mg/dL  Very High:  Greater than or equal to 500 mg/dL    Direct Measure HDL  Female:  Greater than or equal to 50 mg/dL   Male:  Greater than or equal to 40 mg/dL    LDL Cholesterol  Desirable:  <100mg/dL  Above Desirable:  100-129 mg/dL   Borderline High:  130-159 mg/dL   High:  160-189 mg/dL   Very High:  >= 190 mg/dL    Non HDL Cholesterol  Desirable:  130 mg/dL  Above Desirable:  130-159 mg/dL  Borderline High:  160-189 mg/dL  High:  190-219 mg/dL  Very High:  Greater than or equal to 220 mg/dL       If you have any questions or concerns, please call the  clinic at the number listed above.       Sincerely,      Alexy Rojo MD

## 2023-09-29 NOTE — RESULT ENCOUNTER NOTE
It was a pleasure seeing you for your physical examination.  I wanted to get back to you with your test results.  I have enclosed a copy for your review.     I am happy to report that your cbc or complete blood count is normal with no signs of anemia, leukemia or platelet abnormalities. Your chemistry panel shows no signs of diabetes.  Your blood salts, kidney tests, liver tests, and proteins are all fine.    Your total cholesterol is 249 with the normal range being below 200.  Your HDL or good cholesterol is wonderful at 93 with the normal range being above 50.  Your LDL or bad cholesterol is 136 with the normal range being below 130.  Given the excellent HDL the numbers overall are very good.    I am happy to bring you this excellent report.  Please let me know if you have questions.    Alexy Rojo M.D.

## 2023-10-08 LAB
HUMAN PAPILLOMA VIRUS 16 DNA: NEGATIVE
HUMAN PAPILLOMA VIRUS 18 DNA: NEGATIVE
HUMAN PAPILLOMA VIRUS FINAL DIAGNOSIS: NORMAL
HUMAN PAPILLOMA VIRUS OTHER HR: NEGATIVE

## 2023-10-09 ENCOUNTER — PATIENT OUTREACH (OUTPATIENT)
Dept: FAMILY MEDICINE | Facility: CLINIC | Age: 63
End: 2023-10-09
Payer: COMMERCIAL

## 2023-10-09 DIAGNOSIS — R87.810 CERVICAL HIGH RISK HPV (HUMAN PAPILLOMAVIRUS) TEST POSITIVE: Primary | ICD-10-CM

## 2023-10-17 ENCOUNTER — HOSPITAL ENCOUNTER (OUTPATIENT)
Dept: BONE DENSITY | Facility: CLINIC | Age: 63
Discharge: HOME | End: 2023-10-17
Attending: INTERNAL MEDICINE | Admitting: INTERNAL MEDICINE
Payer: COMMERCIAL

## 2023-10-17 DIAGNOSIS — Z00.00 ENCOUNTER FOR ANNUAL PHYSICAL EXAM: ICD-10-CM

## 2023-10-17 PROCEDURE — 77080 DXA BONE DENSITY AXIAL: CPT

## 2023-11-01 LAB — NONINV COLON CA DNA+OCC BLD SCRN STL QL: NEGATIVE

## 2023-12-03 DIAGNOSIS — R10.13 DYSPEPSIA: ICD-10-CM

## 2024-01-03 ENCOUNTER — TRANSFERRED RECORDS (OUTPATIENT)
Dept: HEALTH INFORMATION MANAGEMENT | Facility: CLINIC | Age: 64
End: 2024-01-03
Payer: COMMERCIAL

## 2024-03-03 ENCOUNTER — MYC MEDICAL ADVICE (OUTPATIENT)
Dept: FAMILY MEDICINE | Facility: CLINIC | Age: 64
End: 2024-03-03
Payer: COMMERCIAL

## 2024-03-04 ENCOUNTER — NURSE TRIAGE (OUTPATIENT)
Dept: FAMILY MEDICINE | Facility: CLINIC | Age: 64
End: 2024-03-04
Payer: COMMERCIAL

## 2024-03-04 NOTE — TELEPHONE ENCOUNTER
"Called patient regarding [facility] message below. She states that she is not experiencing any symptoms today. She states she has been taking omeprazole and pepcid.     \"I try to eat mild food with no spices and take one pill before breakfast. I do wake up in the middle of the night and feel something, like it's kind of my stomach but is not pain but uncomfortable. All of this is connected to my gastrointestinal issues.I never have a fever.\"    Vomited on Sunday, \"Not yesterday but the week before.\"      Please advise.  "

## 2024-03-04 NOTE — TELEPHONE ENCOUNTER
Dear Dr. Rojo,     For the past 3 weeks I've been experiencing nausea, dizziness and have vomited a few times as well for no reason. I am wondering if you have any recommendations for these issues, or if I should schedule a visit to you/get a referral to a specialist. Please let me know what you think.     Thank you,  Alicia Barr  [phone]

## 2024-03-04 NOTE — TELEPHONE ENCOUNTER
Patient Contact    Attempt # 1    Was call answered?  Yes. Writer relayed PCP's message below, patient expressed verbal understanding. Appt made:  3/6/2024 1:30 PM (Arrive by 1:10 PM) Hailey Spaulding PA-C [facility]     Jayde Magana RN  -[facility]

## 2024-03-06 ENCOUNTER — OFFICE VISIT (OUTPATIENT)
Dept: FAMILY MEDICINE | Facility: CLINIC | Age: 64
End: 2024-03-06
Payer: COMMERCIAL

## 2024-03-06 VITALS
OXYGEN SATURATION: 100 % | TEMPERATURE: 97.8 F | SYSTOLIC BLOOD PRESSURE: 96 MMHG | HEART RATE: 59 BPM | WEIGHT: 141.8 LBS | RESPIRATION RATE: 16 BRPM | BODY MASS INDEX: 23.63 KG/M2 | DIASTOLIC BLOOD PRESSURE: 53 MMHG | HEIGHT: 65 IN

## 2024-03-06 DIAGNOSIS — R11.2 NAUSEA AND VOMITING, UNSPECIFIED VOMITING TYPE: Primary | ICD-10-CM

## 2024-03-06 LAB
ERYTHROCYTE [DISTWIDTH] IN BLOOD BY AUTOMATED COUNT: 13 % (ref 10–15)
HCT VFR BLD AUTO: 41.9 % (ref 35–47)
HGB BLD-MCNC: 13.6 G/DL (ref 11.7–15.7)
MCH RBC QN AUTO: 29.6 PG (ref 26.5–33)
MCHC RBC AUTO-ENTMCNC: 32.5 G/DL (ref 31.5–36.5)
MCV RBC AUTO: 91 FL (ref 78–100)
PLATELET # BLD AUTO: 280 10E3/UL (ref 150–450)
RBC # BLD AUTO: 4.59 10E6/UL (ref 3.8–5.2)
WBC # BLD AUTO: 6.8 10E3/UL (ref 4–11)

## 2024-03-06 PROCEDURE — 83540 ASSAY OF IRON: CPT | Performed by: PHYSICIAN ASSISTANT

## 2024-03-06 PROCEDURE — 83550 IRON BINDING TEST: CPT | Performed by: PHYSICIAN ASSISTANT

## 2024-03-06 PROCEDURE — 99213 OFFICE O/P EST LOW 20 MIN: CPT | Performed by: PHYSICIAN ASSISTANT

## 2024-03-06 PROCEDURE — 85027 COMPLETE CBC AUTOMATED: CPT | Performed by: PHYSICIAN ASSISTANT

## 2024-03-06 PROCEDURE — 36415 COLL VENOUS BLD VENIPUNCTURE: CPT | Performed by: PHYSICIAN ASSISTANT

## 2024-03-06 RX ORDER — FINASTERIDE 5 MG/1
TABLET, FILM COATED ORAL
COMMUNITY
Start: 2024-03-06

## 2024-03-06 ASSESSMENT — PAIN SCALES - GENERAL: PAINLEVEL: MODERATE PAIN (5)

## 2024-03-06 NOTE — PROGRESS NOTES
HPI: Janet is a 62 yo female with pmh of gastric ulcer (per pt) here with n/v with 2 episodes of vomiting in the past few weeks  She doesn't recall h pylori but it is listed on her History   She has omeprazole on her list but only uses prn  She is on finasteride for hair loss and read that that can cause GI SE  She has remote history of ulcer about 25 years ago  Denies food sticking in throat or unint weight loss  She restarted omeprazole and notes her sxs have improved.  The last time she vomiting was 10 days ago  She is able to eat normally but avoiding spicy foods.  Coffee 2/d  Etoh: none  Denies nsaid use    Past Medical History:   Diagnosis Date    Allergic rhinitis     Anxiety     ASCUS on Pap smear 04/28/2011    HPV-neg    Aseptic necrosis (H)     Cervical high risk HPV (human papillomavirus) test positive 03/11/2022    Helicobacter pylori (H. pylori)     History of colonoscopy 2012    nl    Low back pain     lbp for 20 years, seen 2014 by Dr. Walters    Personal history of tobacco use, presenting hazards to health dced approx 1998    Primary localized osteoarthrosis, pelvic region and thigh     Screening for osteoporosis 10/2023    nl dexa    Skin cancer     Unspecified functional disorder of stomach     peptic ulcer     Past Surgical History:   Procedure Laterality Date    COLONOSCOPY  12/10/2012    Procedure: COLONOSCOPY;  colonoscopy;  Surgeon: Alonzo Milton MD;  Location:  GI    [facility] NONSPECIFIC PROCEDURE  x 2    Right Hip    [facility] NONSPECIFIC PROCEDURE  1969    adenoid removed    [facility] NONSPECIFIC PROCEDURE      hemorrhoid op    Z NONSPECIFIC PROCEDURE  2007    endometrial bx    Z NONSPECIFIC PROCEDURE  2009    d & c, fibroid resection, endometrial ablation    [facility] NONSPECIFIC PROCEDURE  2009    Right total hip arthroplasty.     Social History     Tobacco Use    Smoking status: Former     Packs/day: 0.25     Years: 15.00     Additional pack years: 0.00     Total pack years: 3.75     Types:  "Cigarettes     Quit date: 1999     Years since quittin.1    Smokeless tobacco: Never   Substance Use Topics    Alcohol use: No     Current Outpatient Medications   Medication Sig Dispense Refill    famotidine (PEPCID) 40 MG tablet Take 1 tablet (40 mg) by mouth 2 times daily as needed for heartburn 50 tablet 3    omeprazole (PRILOSEC) 20 MG DR capsule TAKE 1 CAPSULE BY MOUTH EVERY DAY 30-60 MIN BEFORE 1 ST MEAL 90 capsule 1     Allergies   Allergen Reactions    Celecoxib      gi    Morphine And Related      headache      PHYSICAL EXAM:    BP 96/53 (BP Location: Left arm, Patient Position: Sitting, Cuff Size: Adult Regular)   Pulse 59   Temp 97.8  F (36.6  C) (Temporal)   Resp 16   Ht 1.651 m (5' 5\")   Wt 64.3 kg (141 lb 12.8 oz)   SpO2 100%   BMI 23.60 kg/m      Patient appears non toxic  Abd: soft, NT/ND, no masses or hsm    Results for orders placed or performed in visit on 24   CBC with platelets     Status: Normal   Result Value Ref Range    WBC Count 6.8 4.0 - 11.0 10e3/uL    RBC Count 4.59 3.80 - 5.20 10e6/uL    Hemoglobin 13.6 11.7 - 15.7 g/dL    Hematocrit 41.9 35.0 - 47.0 %    MCV 91 78 - 100 fL    MCH 29.6 26.5 - 33.0 pg    MCHC 32.5 31.5 - 36.5 g/dL    RDW 13.0 10.0 - 15.0 %    Platelet Count 280 150 - 450 10e3/uL     Assessment and Plan:     (R11.2) Nausea and vomiting, unspecified vomiting type  (primary encounter diagnosis)  Comment: will check labs, she already started omeprazole and feeling better so okay to continue that. Consider EGD if sxs worsen or persist.    Plan: Helicobacter pylori Antigen Stool, Iron and         iron binding capacity, CBC with platelets              Hailey Spaulding PA-C                  Answers submitted by the patient for this visit:  General Questionnaire (Submitted on 3/6/2024)  Chief Complaint: Chronic problems general questions HPI Form  What is the reason for your visit today? : pain  How many servings of fruits and vegetables do you eat daily?: 2-3  On " average, how many sweetened beverages do you drink each day (Examples: soda, juice, sweet tea, etc.  Do NOT count diet or artificially sweetened beverages)?: 0  How many minutes a day do you exercise enough to make your heart beat faster?: 10 to 19  How many days a week do you exercise enough to make your heart beat faster?: 3 or less  How many days per week do you miss taking your medication?: 0

## 2024-03-07 LAB
IRON BINDING CAPACITY (ROCHE): 252 UG/DL (ref 240–430)
IRON SATN MFR SERPL: 28 % (ref 15–46)
IRON SERPL-MCNC: 70 UG/DL (ref 37–145)

## 2024-03-08 ENCOUNTER — APPOINTMENT (OUTPATIENT)
Dept: LAB | Facility: CLINIC | Age: 64
End: 2024-03-08
Payer: COMMERCIAL

## 2024-03-08 PROCEDURE — 87338 HPYLORI STOOL AG IA: CPT | Performed by: PHYSICIAN ASSISTANT

## 2024-03-11 LAB — H PYLORI AG STL QL IA: NEGATIVE

## 2024-10-25 ENCOUNTER — OFFICE VISIT (OUTPATIENT)
Dept: FAMILY MEDICINE | Facility: CLINIC | Age: 64
End: 2024-10-25
Payer: COMMERCIAL

## 2024-10-25 VITALS
OXYGEN SATURATION: 99 % | BODY MASS INDEX: 24.01 KG/M2 | HEART RATE: 68 BPM | DIASTOLIC BLOOD PRESSURE: 69 MMHG | WEIGHT: 140.6 LBS | HEIGHT: 64 IN | TEMPERATURE: 97.5 F | SYSTOLIC BLOOD PRESSURE: 101 MMHG | RESPIRATION RATE: 16 BRPM

## 2024-10-25 DIAGNOSIS — Z00.00 ENCOUNTER FOR ANNUAL PHYSICAL EXAM: Primary | ICD-10-CM

## 2024-10-25 DIAGNOSIS — R87.810 CERVICAL HIGH RISK HPV (HUMAN PAPILLOMAVIRUS) TEST POSITIVE: ICD-10-CM

## 2024-10-25 LAB
CHOLEST SERPL-MCNC: 230 MG/DL
ERYTHROCYTE [DISTWIDTH] IN BLOOD BY AUTOMATED COUNT: 13.1 % (ref 10–15)
FASTING STATUS PATIENT QL REPORTED: YES
HCT VFR BLD AUTO: 44.8 % (ref 35–47)
HDLC SERPL-MCNC: 96 MG/DL
HGB BLD-MCNC: 14.2 G/DL (ref 11.7–15.7)
LDLC SERPL CALC-MCNC: 116 MG/DL
MCH RBC QN AUTO: 29.3 PG (ref 26.5–33)
MCHC RBC AUTO-ENTMCNC: 31.7 G/DL (ref 31.5–36.5)
MCV RBC AUTO: 93 FL (ref 78–100)
NONHDLC SERPL-MCNC: 134 MG/DL
PLATELET # BLD AUTO: 251 10E3/UL (ref 150–450)
RBC # BLD AUTO: 4.84 10E6/UL (ref 3.8–5.2)
TRIGL SERPL-MCNC: 92 MG/DL
WBC # BLD AUTO: 5.3 10E3/UL (ref 4–11)

## 2024-10-25 PROCEDURE — 99396 PREV VISIT EST AGE 40-64: CPT | Performed by: INTERNAL MEDICINE

## 2024-10-25 PROCEDURE — 36415 COLL VENOUS BLD VENIPUNCTURE: CPT | Performed by: INTERNAL MEDICINE

## 2024-10-25 PROCEDURE — 99459 PELVIC EXAMINATION: CPT | Performed by: INTERNAL MEDICINE

## 2024-10-25 PROCEDURE — 87624 HPV HI-RISK TYP POOLED RSLT: CPT | Performed by: INTERNAL MEDICINE

## 2024-10-25 PROCEDURE — G0145 SCR C/V CYTO,THINLAYER,RESCR: HCPCS | Performed by: INTERNAL MEDICINE

## 2024-10-25 PROCEDURE — 80061 LIPID PANEL: CPT | Performed by: INTERNAL MEDICINE

## 2024-10-25 PROCEDURE — 80053 COMPREHEN METABOLIC PANEL: CPT | Performed by: INTERNAL MEDICINE

## 2024-10-25 PROCEDURE — 85027 COMPLETE CBC AUTOMATED: CPT | Performed by: INTERNAL MEDICINE

## 2024-10-25 SDOH — HEALTH STABILITY: PHYSICAL HEALTH: ON AVERAGE, HOW MANY DAYS PER WEEK DO YOU ENGAGE IN MODERATE TO STRENUOUS EXERCISE (LIKE A BRISK WALK)?: 3 DAYS

## 2024-10-25 ASSESSMENT — SOCIAL DETERMINANTS OF HEALTH (SDOH): HOW OFTEN DO YOU GET TOGETHER WITH FRIENDS OR RELATIVES?: TWICE A WEEK

## 2024-10-25 ASSESSMENT — PAIN SCALES - GENERAL: PAINLEVEL_OUTOF10: NO PAIN (0)

## 2024-10-25 NOTE — PROGRESS NOTES
Preventive Care Visit  [facility] SURYA Rojo MD, Internal Medicine  Oct 25, 2024          Sahara Gonzales is a 64 year old, presenting for the following:    She is doing very well.  She exercises some.  She has no complaints.  She has a history of HPV and needs a follow-up Pap today.  No gynecologic symptoms.  She is overdue for mammogram.                   History of Present Illness:   This patient is a 64 year old female who presents for a complete physical examination.            Past Medical History:      Past Medical History:   Diagnosis Date    Allergic rhinitis     Anxiety     ASCUS on Pap smear 04/28/2011    HPV-neg    Aseptic necrosis (H)     Cervical high risk HPV (human papillomavirus) test positive 03/11/2022    Helicobacter pylori (H. pylori)     History of colonoscopy 2012    nl    Low back pain     lbp for 20 years, seen 2014 by Dr. Walters    Personal history of tobacco use, presenting hazards to [facility] dced approx 1998    Primary localized osteoarthrosis, pelvic region and thigh     Screening for osteoporosis 10/2023    nl dexa    Skin cancer     Unspecified functional disorder of stomach     peptic ulcer             Past Surgical History:      Past Surgical History:   Procedure Laterality Date    COLONOSCOPY  12/10/2012    Procedure: COLONOSCOPY;  colonoscopy;  Surgeon: Alonzo Milton MD;  Location:  GI    ZZC NONSPECIFIC PROCEDURE  x 2    Right Hip    ZZC NONSPECIFIC PROCEDURE  1969    adenoid removed    ZZC NONSPECIFIC PROCEDURE      hemorrhoid op    ZZC NONSPECIFIC PROCEDURE  2007    endometrial bx    ZZC NONSPECIFIC PROCEDURE  2009    d & c, fibroid resection, endometrial ablation    ZZ NONSPECIFIC PROCEDURE  2009    Right total hip arthroplasty.             Social History:     Social History     Socioeconomic History    Marital status:      Spouse name: Not on file    Number of children: 2    Years of education: Not on file    Highest education level:  Not on file   Occupational History    Occupation: cleaning service     Employer: SELF   Tobacco Use    Smoking status: Former     Current packs/day: 0.00     Average packs/day: 0.3 packs/day for 15.0 years (3.8 ttl pk-yrs)     Types: Cigarettes     Start date: 1984     Quit date: 1999     Years since quittin.8    Smokeless tobacco: Not on file   Substance and Sexual Activity    Alcohol use: No    Drug use: No    Sexual activity: Yes   Other Topics Concern    Parent/sibling w/ CABG, MI or angioplasty before 65F 55M? Not Asked   Social History Narrative    Not on file     Social Drivers of Health     Financial Resource Strain: Low Risk  (10/25/2024)    Financial Resource Strain     Within the past 12 months, have you or your family members you live with been unable to get utilities (heat, electricity) when it was really needed?: No   Food Insecurity: Low Risk  (10/25/2024)    Food Insecurity     Within the past 12 months, did you worry that your food would run out before you got money to buy more?: No     Within the past 12 months, did the food you bought just not last and you didn t have money to get more?: No   Transportation Needs: Low Risk  (10/25/2024)    Transportation Needs     Within the past 12 months, has lack of transportation kept you from medical appointments, getting your medicines, non-medical meetings or appointments, work, or from getting things that you need?: No   Physical Activity: Unknown (10/25/2024)    Exercise Vital Sign     Days of Exercise per Week: 3 days     Minutes of Exercise per Session: Not on file   Stress: No Stress Concern Present (10/25/2024)    [employer] [place] [employer] - Occupational Stress Questionnaire     Feeling of Stress : Not at all   Social Connections: Unknown (10/25/2024)    Social Connection and Isolation Panel [NHANES]     Frequency of Communication with Friends and Family: Not on file     Frequency of Social Gatherings with Friends and  "Family: Twice a week     Attends Mandaen Services: Not on file     Active Member of Clubs or Organizations: Not on file     Attends Club or Organization Meetings: Not on file     Marital Status: Not on file   Interpersonal Safety: Not on file   Housing Stability: Low Risk  (10/25/2024)    Housing Stability     Do you have housing? : Yes     Are you worried about losing your housing?: No             Family History:   Reviewed          Immunizations:     Immunization History   Administered Date(s) Administered    COVID-19 Monovalent 18+ (Moderna) 02/11/2021, 03/10/2021    Flu, Unspecified 11/11/1997    Influenza (IIV3) PF 11/11/1997    Influenza Vaccine 18-64 (Flublok) 09/26/2019, 10/27/2020    Influenza Vaccine >6 months,quad, PF 09/20/2018    TD,PF 7+ (Tenivac) 09/29/2023    TDAP Vaccine (Adacel) 05/21/2013    Zoster recombinant adjuvanted (SHINGRIX) 01/12/2021, 03/11/2022            Allergies:     Allergies   Allergen Reactions    Celecoxib      gi    Morphine And Codeine      headache              Medications:     Current Outpatient Medications   Medication Sig Dispense Refill    finasteride (PROSCAR) 5 MG tablet       omeprazole (PRILOSEC) 20 MG DR capsule TAKE 1 CAPSULE BY MOUTH EVERY DAY 30-60 MIN BEFORE 1 ST MEAL 90 capsule 1     No current facility-administered medications for this visit.             Review of Systems:     The 10 point Review of Systems is negative other than noted in the HPI           Physical Exam:   Vitals were reviewed  Blood pressure 101/69, pulse 68, temperature 97.5  F (36.4  C), temperature source Temporal, resp. rate 16, height 1.63 m (5' 4.17\"), weight 63.8 kg (140 lb 9.6 oz), SpO2 99%, not currently breastfeeding.    Exam:  Constitutional: healthy appearing, alert and in no distress  Heent: Normocephalic. Head without obvious masses or lesions. PERRLDC, EOMI. Mouth exam within normal limits: tongue, mucous membranes, posterior pharynx all normal, no lesions or abnormalities seen. "  Tm's and canals within normal limits bilaterally. Neck supple, no nuchal rigidity or masses. No supraclavicular, or cervical adenopathy. Thyroid symmetric, no masses.  Cardiovascular: Regular rate and rhythm, no murmer, rub or gallops.  JVP not elevated, no edema.  Carotids within normal limits bilaterally, no bruits.  Respiratory: Normal respiratory effort.  Lungs clear, normal flow, no wheezing or crackles.  Breasts: Normal bilaterally.  No masses or lesions.  Nipples within normal limites.  No axillary lesions or nodes.  My M.A. Was present during this part of the examination.  Gastrointestinal: Normal active bowel sounds.   Soft, not tender, no masses, guarding or rebound.  No hepatosplenomegaly.   Pelvic: External genitalia within normal limits.  No masses or lesions.  Speculum exam unremarkable.  Cervix intact, no lesions. No significant discharge seen. Bimanual exam within normal limits.  No abnormal masses felt and no tenderness.  Recto-vaginal exam within normal limits.  Exam chaperoned by nurse.  Musculoskeletal: extremities normal, no gross deformities noted.  Skin: no suspicious lesions or rashes   Neurologic: Mental status within normal limits.  Speech fluent.  No gross motor abnormalities and gait intact.  Psychiatric: mentation appears normal and affect normal.         Data:   Labs sent        Assessment:   Normal complete physical exam  History of abnormal Pap smear, follow-up done today  Healthcare maintenance         Plan:   She does not want vaccines  Up-to-date colon exam  Letter with labs  Exercise and diet  Up-to-date on Cologuard        Alexy Rojo MD

## 2024-10-26 LAB
ALBUMIN SERPL BCG-MCNC: 4.3 G/DL (ref 3.5–5.2)
ALP SERPL-CCNC: 73 U/L (ref 40–150)
ALT SERPL W P-5'-P-CCNC: 16 U/L (ref 0–50)
ANION GAP SERPL CALCULATED.3IONS-SCNC: 9 MMOL/L (ref 7–15)
AST SERPL W P-5'-P-CCNC: 24 U/L (ref 0–45)
BILIRUB SERPL-MCNC: 0.4 MG/DL
BUN SERPL-MCNC: 15.2 MG/DL (ref 8–23)
CALCIUM SERPL-MCNC: 9.8 MG/DL (ref 8.8–10.4)
CHLORIDE SERPL-SCNC: 106 MMOL/L (ref 98–107)
CREAT SERPL-MCNC: 0.82 MG/DL (ref 0.51–0.95)
EGFRCR SERPLBLD CKD-EPI 2021: 79 ML/MIN/1.73M2
FASTING STATUS PATIENT QL REPORTED: YES
GLUCOSE SERPL-MCNC: 94 MG/DL (ref 70–99)
HCO3 SERPL-SCNC: 26 MMOL/L (ref 22–29)
POTASSIUM SERPL-SCNC: 4.4 MMOL/L (ref 3.4–5.3)
PROT SERPL-MCNC: 6.6 G/DL (ref 6.4–8.3)
SODIUM SERPL-SCNC: 141 MMOL/L (ref 135–145)

## 2024-10-30 LAB
HPV HR 12 DNA CVX QL NAA+PROBE: NEGATIVE
HPV16 DNA CVX QL NAA+PROBE: NEGATIVE
HPV18 DNA CVX QL NAA+PROBE: NEGATIVE
HUMAN PAPILLOMA VIRUS FINAL DIAGNOSIS: NORMAL

## 2024-10-31 LAB
BKR AP ASSOCIATED HPV REPORT: NORMAL
BKR LAB AP GYN ADEQUACY: NORMAL
BKR LAB AP GYN INTERPRETATION: NORMAL
BKR LAB AP PREVIOUS ABNL DX: NORMAL
BKR LAB AP PREVIOUS ABNORMAL: NORMAL
PATH REPORT.COMMENTS IMP SPEC: NORMAL
PATH REPORT.COMMENTS IMP SPEC: NORMAL
PATH REPORT.RELEVANT HX SPEC: NORMAL

## 2024-11-07 ENCOUNTER — HOSPITAL ENCOUNTER (OUTPATIENT)
Dept: MAMMOGRAPHY | Facility: CLINIC | Age: 64
Discharge: HOME | End: 2024-11-07
Attending: INTERNAL MEDICINE | Admitting: INTERNAL MEDICINE
Payer: COMMERCIAL

## 2024-11-07 DIAGNOSIS — Z12.31 VISIT FOR SCREENING MAMMOGRAM: ICD-10-CM

## 2024-11-07 PROCEDURE — 77063 BREAST TOMOSYNTHESIS BI: CPT

## 2024-11-20 NOTE — RESULT ENCOUNTER NOTE
It was very nice to see you.  You should be able to view all of your test results.    Your CBC or blood count is normal with no signs of anemia or blood disorders.  Your chemistry panel shows no diabetes.  Your blood salts, kidney tests, liver tests, and proteins are all normal.    Your total cholesterol is 230.  Your HDL or good cholesterol is wonderful.  Your LDL or bad cholesterol is fine as well.    I am happy to bring you this excellent report.  Please let me know if you have questions.    Alexy Rojo M.D. [Mother] : mother

## 2025-02-26 ENCOUNTER — TELEPHONE (OUTPATIENT)
Dept: OTHER | Facility: CLINIC | Age: 65
End: 2025-02-26
Payer: COMMERCIAL

## 2025-03-24 ENCOUNTER — OFFICE VISIT (OUTPATIENT)
Dept: VASCULAR SURGERY | Facility: CLINIC | Age: 65
End: 2025-03-24
Payer: COMMERCIAL

## 2025-03-24 DIAGNOSIS — I83.93 SPIDER VEINS OF BOTH LOWER EXTREMITIES: Primary | ICD-10-CM

## 2025-03-24 DIAGNOSIS — I83.91 VARICOSE VEINS OF RIGHT LOWER LEG: ICD-10-CM

## 2025-03-24 PROCEDURE — 99202 OFFICE O/P NEW SF 15 MIN: CPT | Performed by: SURGERY

## 2025-03-24 RX ORDER — AMOXICILLIN 500 MG/1
CAPSULE ORAL
Qty: 4 CAPSULE | Refills: 1 | Status: SHIPPED | OUTPATIENT
Start: 2025-03-24

## 2025-03-24 RX ORDER — AMOXICILLIN 500 MG/1
500 CAPSULE ORAL 2 TIMES DAILY
Qty: 4 CAPSULE | Refills: 0 | Status: SHIPPED | OUTPATIENT
Start: 2025-03-24 | End: 2025-03-24

## 2025-03-24 NOTE — PATIENT INSTRUCTIONS
Sclerotherapy: Pre-Treatment Instructions    Recommended Sessions:  ___1+___ treatment sessions    Pricing: Full session - $428                 *Payment is due at the time of visit following the treatment    Time Required per Treatment Session - About 45 minutes  Please come in 15 minutes before your scheduled appointment.  30 min.  Sclerotherapy treatments last approximately 30 minutes.  5 min.    A staff member will wipe your legs off with warm water and dry them with a wash cloth.                 Then you can put your compression hose on, get dressed and check out.  10 min.  After your treatment, you will be asked to walk around for 10 minutes before you get in your car.    Medications  Five days before your appointment, discontinue aspirin (Bufferin, Anacin, etc.) and Ibuprofen (Motrin, Advil, Aleve, etc.) to reduce bruising. Resume these medications the day following the treatment.  One hour before your appointment you must take your prophylactic antibiotic.    Leg Preparation  Do not shave your legs or apply any oil, lotion or powder the night before or the day of your treatment.    Clothing  Shorts:  Bring a pair of loose, comfortable shorts to wear during your treatment (or you can choose to wear ours). Shoes: Bring comfortable shoes to accommodate the compression hose after your treatment. Do not wear flip-flops or thong-style sandals unless you have open-toe compression hose.    Photographs  Photos will be taken before each treatment. This helps monitor your progress.    Injections  The physician will inject your veins with the sclerotherapy solution chosen to meet your specific needs.    Compression Hose  Please bring your compression hose if you have them. They may also be reserved for you at our clinic. Compression hose must be worn immediately after each sclerotherapy treatment.  The hose must be compression level 20-30, and they must be worn for 24 hours straight after your treatment.  If you have  never worn compression hose before, a staff member will teach you how to put them on.             You cannot have a treatment without compression hose.               They are critical to the success of your treatment!    You may purchase your compression hose from us. We will measure you and have the hose available when you come for your treatment.    Cancellation and Rescheduling  If you need to cancel or reschedule your sclerotherapy treatment, please give our office at least 24 hour notice.    Sclerotherapy: Basic Information    What is sclerotherapy?  Sclerotherapy is a treatment for  spider  veins.  Spider  veins are small veins just under your skin that can look red, blue or purple. Most  spider  veins are only a cosmetic problem.  Spider  veins are not useful and treating them will not affect your circulation.    How does sclerotherapy work?  1.  Injections: A very small needle is used to inject a solution into the  spider  veins. The solution irritates the cells that line the vein walls. This causes the veins to collapse. The vein walls to stick together and they can no longer carry blood. Different solutions are used based on the size of the veins.  2.  Compression:  The spider veins are kept collapsed by wearing compression stockings. Your body will break down and absorb the treated veins. You wear the compression hose for 24 hours after the treatment and then for 4 more days during your waking hours only.    How does the body heal after sclerotherapy?  The process is similar to how your body heals after a bad bruise. It takes 4-6 weeks or more for the healing to be complete. When the healing is complete, the vein is no longer visible. It may take more than one treatment.    How do I get the best results?  It is important to follow the post-sclerotherapy instructions. The best results require time and patience. The injection sites will continue to heal and fade for months after a treatment. Please discuss  your expectations with your doctor to keep them realistic. Your doctor will do everything possible to meet or exceed your expectations.    How many treatments are needed?  After your initial exam, your doctor will give you an estimate of the number of treatments that may be required. It depends upon the size, type, and quantity of your  spider  veins and on the doctor's assessment, your history and expectations. You may end up needing fewer or more treatments.    How soon can I have another treatment?  Additional treatments are scheduled every 4-6 weeks to allow time for the body to respond to the previous treatment.    Common Side Effects:  Itching  The areas that were injected may itch. This is usually mild and lasts less than a day. Do not use lotions or creams on your legs until the injection sites have healed over.    Pain  It is common to have some tenderness at the injection sites. Injection of the solution can be uncomfortable, but is usually well tolerated by most patients. The tenderness is temporary, lasting 24 hours at most. Tylenol or Ibuprofen can be used, if needed, following the product directions.    Bruising  This may occur at the injections sites. Bruising may be minimized by avoiding Aspirin and Ibuprofen products for five days before each treatment session.    Hyperpigmentation  A light brown discoloration of the skin may develop along the veins in the areas injected. Approximately 20-30% of patients treated note the discoloration (which is lighter and less obvious than the veins that are being treated). The hyperpigmentation usually fades in a couple of weeks, but may take several months to a year to totally resolve. There is a 1% chance of hyperpigmentation continuing after one year.    Trapped blood  A small amount of blood may become trapped and hardened in the veins. This may feel like a knot or cord and it may look dark blue or bruised. This is a common occurrence. You may need to return  before your next treatment so this area can be drained to remove the trapped blood. This will reduce the hyperpigmentation that can occur. The chance of this occurring can be decreased with proper use of compression hose after your treatment.    Matting  Matting is the formation of new, fine  spider  veins in the area injected.  It occurs in approximately 10% of patients injected. The exact reason for this is unknown. If untreated, the matting usually resolves in 3 to 12 months, but very rarely, it can be permanent. If the matting does not fade, it can be re-injected.    Rare Side Effects:  Ulceration at injection sites  Very rarely, a small ulcer will occur at the site where a vein is injected. An ulcer can take 4 to 6 weeks to completely heal. A small scar may result.    Allergic reactions  There is a very rare incidence of an allergic reaction to the solution injected. You will be observed for such reaction and will be treated appropriately should it occur. Please inform us of any allergy history.    Pulmonary embolus/Deep vein thrombosis  This is a blood clot which moves to the lungs/a blood clot in the deep vein system. There is an extraordinarily low incidence of this complication.      SCLEROTHERAPY AFTER CARE  Immediately:  After treatment, walk for 10-15 minutes before getting in your car.  If your trip home is more than 1 hour, stop and walk around for 5-10 minutes. Avoid sitting or standing for extended periods.   First 24 hours: Wear your compression continuously, even while in bed. After the 24 hours, you may shower if you want to. Put your hose back on, unless you are going to bed. You should NOT wear compression to bed after the first 24 hours. You may fly the next day, but wear your compression.   For 5 days: Wear the compression hose for waking hours only. You may continue to wear them longer than 5 days if you prefer.   For days 5-7: Walking is encouraged, as it promotes efficient circulation in  your veins. You may do activities that raise your heart rate, but do NOT run, jog, do high impact aerobics, or weight lifting. After 7 days, no activity restrictions.  Shaving: Wait a few days to shave or apply lotion.   Bathing: Do NOT take hot baths or sit in a hot tub for 7-10 days.    For 1 year: Wear SPF 30 sunscreen on your legs when in the sun. This is very important! It helps prevent darkening of the skin at the injection sites.   Medications: You may resume your usual medications, including aspirin or ibuprofen.    Common Things to Expect       Compression must be worn for the first 24 hours and then during the day for 5-7 days.    If larger veins are treated with ultrasound-guided sclerotherapy, you will have redness, firmness, tenderness, and swelling.  This firmness and tenderness may take 3-6 months to resolve. Ibuprofen and compression hose will aid in this process.    You will have bruising that can last up to 3 weeks. Most fading of the veins will occur between 3 and 6 weeks after treatment.    You may notice brown discoloration (hyperpigmentation) at the treatment site.  This should fade with time, but will take 3 months to 1 year to fully heal.     Some treated veins may look darker because of trapped blood within the vein. This trapped blood can be removed at a minimum of 1 month following treatment. Larger veins are more likely to develop trapped blood.    It is very important for you to use at least SPF 30 sunscreen in order to help prevent the discoloration of your skin.    Migraines rarely occur following sclerotherapy, but are more likely in patients with a history of migraines.  Treat as you would any other migraine.   Thigh High, medical grade, 20-30 mmHg strength, compression stockings are recommended (may be required if having a vein procedure or treatment)    Options for purchasing compression stockings:    Call your insurance company and ask if compression stockings are covered.  If  they are covered, they usually fall under your Durable Medical Equipment (DME) coverage.  The DME codes if they ask are: Knee high , Thigh high , Panty .   Be sure to ask if you need a specific medical diagnosis for coverage, if your deductible needs to be met first, how many pairs are covered and how often you can get them.  If insurance covers them and you would like to order from [facility], or another medical supply company: contact the vein clinic and we will fax a prescription to your medical supply company of choice. They will bill your insurance and ship them to you. Please let us know your color choice (black or beige), and toe choice (open or closed toe) when contacting us.    2.  We sell regular sizes of [employer] in our clinic (except specialty order or petite sizing). Ask us to check if we have your size. We cannot bill your insurance for these purchases.  Knee high are $65/pair  Thigh high are $90/pair.   If you would like to purchase from the clinic, let us know including your color choice (black or beige), and toe choice (open or closed toe). We will set them aside for you to  and pay for at your next clinic appointment or the day of your procedure.    3.  Online website ordering options:   [employer] has many options available.

## 2025-03-24 NOTE — PROGRESS NOTES
[facility]: Rayne Barr comes for evaluation of her spider veins and cosmetic varicosities in her right leg.  This 64-year-old patient was a  when she was in her teens.  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    Over time she has developed significant spider veins primarily in the anterior thighs bilaterally.  Also small varicose veins  on the right lateral thigh-lateral calf-patellar-medial calf.  These are not very painful has had no complications such as phlebitis, skin changes, bleeding.      PMH: Medications: Proscar, Prilosec   Medical: Hyperlipidemia but excellent HDL and adequate LDL--no need for treatment      BMI= 24 kg meter square   weight= 64 kg    10/25/2024 laboratory: K= 0.4 SCr= 0.82 LDL= 116   glucose= 94   Hgb= 14.2    Exam: Alert and appropriate.  Petite.  [place] accent with good English.   Chest= clear   Cardiovascular= regular rate   Well-healed right lateral hip his surgical incision with full range of motion   No swelling.  No skin changes.  Normal sensation.   +2 DP pulses bilaterally.   1 to 2 mm varicose veins in the right leg as documented in the drawing.    With her pale complexion these may actually be prominent reticular veins   Fairly dense spider veins primarily in the thigh region bilaterally (see photo)    No significant varicosities left leg.    VEIN CLINIC LEG DRAWING:          VCSS; Right= 4.  Left=2   CEAP: Right= C2.  Left= C1    IMPRESSION: #1.  Cosmetic spider veins.  Patient is interested in treatment.  We discussed risks and benefits of sclerotherapy and the compounds we use.  Also discussed posttreatment compression protocol with thigh-high compression stockings which she has at home but did not bring with her that she will wear for at least 3 to 5 days postprocedure.  She is aware it may take several weeks to months before the  bruising associated with the procedure will gradually resolve in the caution with sun exposure.  We would try to treat as many of the spider veins as possible under single session but are limited by the volume of the injection compound and thus other treatments may be necessary.     #2.  We also discussed her varicose veins.  These are large enough I would not recommend sclerotherapy but stab phlebectomies.  This would be performed in the clinic under light oral sedation with a tumescent anesthetic.  At the present time she would like her spider veins treated initially.   With the location of her small varicose veins I do not feel formal duplex ultrasound is indicated at this time.       #3.  Due to her total joint I did recommend amoxicillin 2 g orally part of the sclerotherapy indefinitely prior to any phlebectomy procedure.  She does take this on a regular basis prior to any dental work.    Manuel Aguila MD

## 2025-03-24 NOTE — PROGRESS NOTES
After Visit Follow Up  Per Dr. Aguila, patient was recommended to have 1+sessions at $428.00 of cosmetic sclerotherapy.    Reviewed with and gave to patient our vein clinic sclerotherapy packet of information which includes basic sclerotherapy information, pre-treatment instructions and post-treatment instructions.    Patient has hip replacement on right, she understands she will need to take amoxicillin 2G ONE HOUR prior to her appt. Amoxicillin dose and one refill ordered for her today.     Patient in agreement with plan and had no further questions.    Lalitha Cheatham RN on 3/24/2025 at 1:38 PM

## 2025-03-24 NOTE — LETTER
3/24/2025      Janet Barr  [address] [place] [address]      Dear Colleague,    Thank you for referring your patient, Janet Barr, to the [facility] [place]. Please see a copy of my visit note below.     [facility]: Rayne Barr comes for evaluation of her spider veins and cosmetic varicosities in her right leg.  This 64-year-old patient was a  when she was in her teens.  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    Over time she has developed significant spider veins primarily in the anterior thighs bilaterally.  Also small varicose veins  on the right lateral thigh-lateral calf-patellar-medial calf.  These are not very painful has had no complications such as phlebitis, skin changes, bleeding.      PMH: Medications: Proscar, Prilosec   Medical: Hyperlipidemia but excellent HDL and adequate LDL--no need for treatment      BMI= 24 kg meter square   weight= 64 kg    10/25/2024 laboratory: K= 0.4 SCr= 0.82 LDL= 116   glucose= 94   Hgb= 14.2    Exam: Alert and appropriate.  Petite.  [place] accent with good English.   Chest= clear   Cardiovascular= regular rate   Well-healed right lateral hip his surgical incision with full range of motion   No swelling.  No skin changes.  Normal sensation.   +2 DP pulses bilaterally.   1 to 2 mm varicose veins in the right leg as documented in the drawing.    With her pale complexion these may actually be prominent reticular veins   Fairly dense spider veins primarily in the thigh region bilaterally (see photo)    No significant varicosities left leg.    VEIN CLINIC LEG DRAWING:          VCSS; Right= 4.  Left=2   CEAP: Right= C2.  Left= C1    IMPRESSION: #1.  Cosmetic spider veins.  Patient is interested in treatment.  We discussed risks and benefits of sclerotherapy and the compounds we use.  Also  discussed posttreatment compression protocol with thigh-high compression stockings which she has at home but did not bring with her that she will wear for at least 3 to 5 days postprocedure.  She is aware it may take several weeks to months before the bruising associated with the procedure will gradually resolve in the caution with sun exposure.  We would try to treat as many of the spider veins as possible under single session but are limited by the volume of the injection compound and thus other treatments may be necessary.     #2.  We also discussed her varicose veins.  These are large enough I would not recommend sclerotherapy but stab phlebectomies.  This would be performed in the clinic under light oral sedation with a tumescent anesthetic.  At the present time she would like her spider veins treated initially.   With the location of her small varicose veins I do not feel formal duplex ultrasound is indicated at this time.       #3.  Due to her total joint I did recommend amoxicillin 2 g orally part of the sclerotherapy indefinitely prior to any phlebectomy procedure.  She does take this on a regular basis prior to any dental work.    Manuel Aguila MD         After Visit Follow Up  Per Dr. Aguila, patient was recommended to have 1+sessions at $428.00 of cosmetic sclerotherapy.    Reviewed with and gave to patient our vein clinic sclerotherapy packet of information which includes basic sclerotherapy information, pre-treatment instructions and post-treatment instructions.    Patient has hip replacement on right, she understands she will need to take amoxicillin 2G ONE HOUR prior to her appt. Amoxicillin dose and one refill ordered for her today.     Patient in agreement with plan and had no further questions.    Lalitha Cheatham RN on 3/24/2025 at 1:38 PM      Again, thank you for allowing me to participate in the care of your patient.        Sincerely,        Manuel Aguila MD    Electronically  signed

## 2025-04-21 ENCOUNTER — OFFICE VISIT (OUTPATIENT)
Dept: VASCULAR SURGERY | Facility: CLINIC | Age: 65
End: 2025-04-21
Payer: COMMERCIAL

## 2025-04-21 DIAGNOSIS — I83.93 SPIDER VEINS OF BOTH LOWER EXTREMITIES: Primary | ICD-10-CM

## 2025-04-21 DIAGNOSIS — I83.91 ASYMPTOMATIC VARICOSE VEINS OF RIGHT LOWER EXTREMITY: ICD-10-CM

## 2025-04-21 PROCEDURE — 36468 NJX SCLRSNT SPIDER VEINS: CPT | Performed by: SURGERY

## 2025-04-21 PROCEDURE — S9999 SALES TAX: HCPCS | Performed by: SURGERY

## 2025-04-21 NOTE — LETTER
4/21/2025      Janet Barr  [address] [facility] [address]      Dear Colleague,    Thank you for referring your patient, Janet Barr, to the [facility] [place]. Please see a copy of my visit note below.    [facility]: Rayne Barr comes today for first sclerotherapy session.  Was seen on 3/24/2025 for spider veins primarily on right and left anterior thighs with a small varicose vein on the right lateral thigh-lateral calf-patella-medial calf.  Discussed sclerotherapy at that time but also stab phlebectomies on the varicosity over the right patellar region in the future.  She wanted proceed initially with sclerotherapy.  Risk benefits reviewed today.    Recommended amoxicillin prophylactics due to her prior total joint replacement.  Due to repeated trauma from figure skating when younger.        Vein Clinic Sclerotherapy Note     Janet Barr is a 64 year old female who was seen in clinic today for Sclerotherapy.    Sclerotherapy    Date/Time: 4/21/2025 2:20 PM    Performed by: Manuel Aguila MD  Authorized by: Manuel Aguila MD    Time out: Immediately prior to the procedure a time out was called    Circulator:  Lalitha Muniz RN     Type:  Cosmetic  Session:  Full  Procedure side:  Bilateral  Solution/Amount:  1% POLIDOCANOL  Syringes:  10  Patient tolerance:  Patient tolerated the procedure well with no immediate complications  Wrap/Hose:  Hose      Risks and benefits reviewed.  Primary spider veins are over the distal anterior thighs.  Small reticular veins in the proximal medial right and left calf.    We used a 30-gauge needle in the Syris polarized magnified system.    5-1/2 syringes on the right and 4-1/2 on the left with good initial results.  On each leg one of the syringes was foam 1-1 to help treat very dense complex areas each thigh.  Also treated with the foam solution of the small reticular vein  bilaterally.    Patient tolerated procedure well.  Occasional mild discomfort bilaterally with injection.  Almost all of the visible sites were treated bilaterally.    Thigh-high compression was applied.  Discussed post sclerotherapy protocol to wear her compression overnight and shower tomorrow but then wear these for the next 4 to 5 days during the day.  No specific restrictions to activities.  Caution with sun exposure.      She may decide on selective phlebectomy of the larger reticular/varicose veins particular of the right patellar region.  This to be reevaluate on follow-up.    Compression was applied.    Manuel Aguila MD    Dictated using Dragon voice recognition software which may result in transcription errors      Again, thank you for allowing me to participate in the care of your patient.        Sincerely,        Manuel Aguila MD    Electronically signed

## 2025-04-21 NOTE — PROGRESS NOTES
[facility]: Rayne    Janet Barr comes today for first sclerotherapy session.  Was seen on 3/24/2025 for spider veins primarily on right and left anterior thighs with a small varicose vein on the right lateral thigh-lateral calf-patella-medial calf.  Discussed sclerotherapy at that time but also stab phlebectomies on the varicosity over the right patellar region in the future.  She wanted proceed initially with sclerotherapy.  Risk benefits reviewed today.    Recommended amoxicillin prophylactics due to her prior total joint replacement.  Due to repeated trauma from figure skating when younger.        Vein Clinic Sclerotherapy Note     Janet Barr is a 64 year old female who was seen in clinic today for Sclerotherapy.    Sclerotherapy    Date/Time: 4/21/2025 2:20 PM    Performed by: Manuel Aguila MD  Authorized by: Manuel Aguila MD    Time out: Immediately prior to the procedure a time out was called    Circulator:  Lalitha Muniz RN     Type:  Cosmetic  Session:  Full  Procedure side:  Bilateral  Solution/Amount:  1% POLIDOCANOL  Syringes:  10  Patient tolerance:  Patient tolerated the procedure well with no immediate complications  Wrap/Hose:  Hose      Risks and benefits reviewed.  Primary spider veins are over the distal anterior thighs.  Small reticular veins in the proximal medial right and left calf.    We used a 30-gauge needle in the Syris polarized magnified system.    5-1/2 syringes on the right and 4-1/2 on the left with good initial results.  On each leg one of the syringes was foam 1-1 to help treat very dense complex areas each thigh.  Also treated with the foam solution of the small reticular vein bilaterally.    Patient tolerated procedure well.  Occasional mild discomfort bilaterally with injection.  Almost all of the visible sites were treated bilaterally.    Thigh-high compression was applied.  Discussed post sclerotherapy protocol to wear her  compression overnight and shower tomorrow but then wear these for the next 4 to 5 days during the day.  No specific restrictions to activities.  Caution with sun exposure.      She may decide on selective phlebectomy of the larger reticular/varicose veins particular of the right patellar region.  This to be reevaluate on follow-up.    Compression was applied.    Manuel Aguila MD    Dictated using Dragon voice recognition software which may result in transcription errors

## 2025-06-02 ENCOUNTER — OFFICE VISIT (OUTPATIENT)
Dept: VASCULAR SURGERY | Facility: CLINIC | Age: 65
End: 2025-06-02
Payer: COMMERCIAL

## 2025-06-02 DIAGNOSIS — I83.93 SPIDER VEINS OF BOTH LOWER EXTREMITIES: Primary | ICD-10-CM

## 2025-06-02 DIAGNOSIS — I83.91 ASYMPTOMATIC VARICOSE VEIN OF RIGHT LOWER EXTREMITY WITHOUT COMPLICATION: ICD-10-CM

## 2025-06-02 DIAGNOSIS — Z96.641 HISTORY OF TOTAL HIP ARTHROPLASTY, RIGHT: ICD-10-CM

## 2025-06-02 PROCEDURE — 36468 NJX SCLRSNT SPIDER VEINS: CPT | Performed by: SURGERY

## 2025-06-02 PROCEDURE — S9999 SALES TAX: HCPCS | Performed by: SURGERY

## 2025-06-02 RX ORDER — AMOXICILLIN 500 MG/1
CAPSULE ORAL
Qty: 4 CAPSULE | Refills: 1 | Status: SHIPPED | OUTPATIENT
Start: 2025-06-02

## 2025-06-02 NOTE — PROGRESS NOTES
[facility]: Rayne    Janet Barr has a history of spider veins.  Primarily involving the right and left anterior thighs and a small varicose vein on the right lateral thigh-lateral calf.  Underwent sclerotherapy on 4/21/2025 with no complications.    She tolerated the session well.  She still has spider veins and several varicose veins.  Varicose veins in the right is still present and may require phlebectomy due to its size.      Patient is interested in further sclerotherapy.  Risk benefits again reviewed        Vein Clinic Sclerotherapy Note     Janet Barr is a 64 year old female who was seen in clinic today for Sclerotherapy.    Sclerotherapy    Date/Time: 6/2/2025 1:24 PM    Performed by: Manuel Aguila MD  Authorized by: Manuel Aguila MD    Time out: Immediately prior to the procedure a time out was called    Preparation: Patient was prepped and draped in usual sterile fashion    1st Assist:  Cherise Medina CST/BELKIS     Type:  Cosmetic  Session:  Full  Procedure side:  Bilateral  Solution/Amount:  .5 POLIDOCANOL  Syringes:  10  Patient tolerance:  Patient tolerated the procedure well with no immediate complications  Wrap/Hose:  Hose   patient did take amoxicillin 2 g orally for prophylaxis due to her prosthetic joints    Spider veins in her on the right much greater than the left anterior thigh into particular the right posterior thigh with fewer on the left proximal posterior thigh.  All sites identified.  She did have improvement after her initial result but still with extensive spider veins.  Very few Spider veins    2 mm varicosity on the right anterior lateral thigh-lateral knee-lateral calf was present and not treated.    We used the [employer] polarized magnified light system with a 30-gauge needle.  Undiluted 0.5% polidocanol was used for all spider veins.  We were able to treat almost all visible spider veins on the right anterior and posterior thigh.  Also all  of the left anterior thigh the very proximal posterior thigh did not have enough solution to treat this session.    Patient was uncomfortable at times due to the needle pokes but overall did well and there was excellent results noted.  Compression was applied.    I used a total of 7 syringes in the right and 3 syringes on the left for the sclerotherapy.  Further sessions may be indicated and this was discussed left posterior proximal thigh    Manuel Aguila MD    Dictated using Dragon voice recognition software which may result in transcription errors

## 2025-06-02 NOTE — LETTER
6/2/2025      Janet Barr  [address]  [facility] [address]      Dear Colleague,    Thank you for referring your patient, Janet Barr, to the [facility] [place]. Please see a copy of my visit note below.    [facility]: Rayne Barr has a history of spider veins.  Primarily involving the right and left anterior thighs and a small varicose vein on the right lateral thigh-lateral calf.  Underwent sclerotherapy on 4/21/2025 with no complications.    She tolerated the session well.  She still has spider veins and several varicose veins.  Varicose veins in the right is still present and may require phlebectomy due to its size.      Patient is interested in further sclerotherapy.  Risk benefits again reviewed        Vein Clinic Sclerotherapy Note     Janet Barr is a 64 year old female who was seen in clinic today for Sclerotherapy.    Sclerotherapy    Date/Time: 6/2/2025 1:24 PM    Performed by: Manuel Aguila MD  Authorized by: Manuel Aguila MD    Time out: Immediately prior to the procedure a time out was called    Preparation: Patient was prepped and draped in usual sterile fashion    1st Assist:  Cherise Medina CST/BELKIS     Type:  Cosmetic  Session:  Full  Procedure side:  Bilateral  Solution/Amount:  .5 POLIDOCANOL  Syringes:  10  Patient tolerance:  Patient tolerated the procedure well with no immediate complications  Wrap/Hose:  Hose   patient did take amoxicillin 2 g orally for prophylaxis due to her prosthetic joints    Spider veins in her on the right much greater than the left anterior thigh into particular the right posterior thigh with fewer on the left proximal posterior thigh.  All sites identified.  She did have improvement after her initial result but still with extensive spider veins.  Very few Spider veins    2 mm varicosity on the right anterior lateral thigh-lateral knee-lateral calf was present and not  treated.    We used the N(i)Â² polarized magnified light system with a 30-gauge needle.  Undiluted 0.5% polidocanol was used for all spider veins.  We were able to treat almost all visible spider veins on the right anterior and posterior thigh.  Also all of the left anterior thigh the very proximal posterior thigh did not have enough solution to treat this session.    Patient was uncomfortable at times due to the needle pokes but overall did well and there was excellent results noted.  Compression was applied.    I used a total of 7 syringes in the right and 3 syringes on the left for the sclerotherapy.  Further sessions may be indicated and this was discussed left posterior proximal thigh    Manuel Aguila MD    Dictated using Dragon voice recognition software which may result in transcription errors    Pre-Treatment Nursing Note    Janet Barr presents to clinic for Sclerotherapy    Patient has a history of: Right CARO  Prophylactic Medication:Antibiotics, Amoxicillin 2g,   Time Taken: 1200   Compression Stockings: Patient brought with today.  The treatment is being performed on BLE.  Patient understanding of procedure matches consent? YES    Tiffany Victoria RN  [facility]  Vein Clinic      Again, thank you for allowing me to participate in the care of your patient.        Sincerely,        Manuel Aguila MD    Electronically signed

## 2025-06-02 NOTE — PROGRESS NOTES
Pre-Treatment Nursing Note    Janet Barr presents to clinic for Sclerotherapy    Patient has a history of: Right CARO  Prophylactic Medication:Antibiotics, Amoxicillin 2g,   Time Taken: 1200   Compression Stockings: Patient brought with today.  The treatment is being performed on BLE.  Patient understanding of procedure matches consent? YES    Tiffany Victoria RN  [facility]  Vein Clinic